# Patient Record
Sex: MALE | Race: WHITE | NOT HISPANIC OR LATINO | URBAN - METROPOLITAN AREA
[De-identification: names, ages, dates, MRNs, and addresses within clinical notes are randomized per-mention and may not be internally consistent; named-entity substitution may affect disease eponyms.]

---

## 2023-12-27 PROCEDURE — 88305 TISSUE EXAM BY PATHOLOGIST: CPT

## 2023-12-27 PROCEDURE — 88305 TISSUE EXAM BY PATHOLOGIST: CPT | Performed by: PATHOLOGY

## 2023-12-28 ENCOUNTER — LAB REQUISITION (OUTPATIENT)
Dept: LAB | Facility: HOSPITAL | Age: 58
End: 2023-12-28
Payer: COMMERCIAL

## 2023-12-28 DIAGNOSIS — M67.472 GANGLION, LEFT ANKLE AND FOOT: ICD-10-CM

## 2023-12-31 LAB
LABORATORY COMMENT REPORT: NORMAL
PATH REPORT.FINAL DX SPEC: NORMAL
PATH REPORT.GROSS SPEC: NORMAL
PATH REPORT.RELEVANT HX SPEC: NORMAL
PATH REPORT.TOTAL CANCER: NORMAL

## 2024-10-02 PROBLEM — R00.2 PALPITATIONS: Status: ACTIVE | Noted: 2024-10-02

## 2024-10-02 NOTE — PROGRESS NOTES
Subjective   Patient ID:   Christopher Ramon is a 59 y.o. male who presents for Rapid Heart Rate and Dizziness.  HPI  New patient here today to establish care with myself.  Last PCP: N/A  Last seen:  No significant PMH.  Not currently on medications.    Here with acute symptoms:  Symptoms x within the past month.  Palpitations.  Off and on.  No specific trigger.  No significant heart history.  BP is very high at 162/100.  Has never been on BP medication.  EKG today shows NSR.  Denies CP, SOB.  Denies dizziness, headaches.    Sinus infection:  Symptoms x 2 weeks.  Sinus pressure.  Right ear pain.  Has worked outside in the rain.  Denies fever.    Tick bites:  Works outside for a living.  Would like checked for lyme disease.  No specific symptoms pertaining to this.    Health maintenance:  Smoking: Never a smoker.  PSA (50+): DUE  Labs: DUE  Colonoscopy (50-75): DUE - will defer for now  Influenza:    Review of Systems  12 point review of systems negative unless stated above in HPI    Vitals:    10/08/24 1055   BP: (!) 162/100   Pulse: 70   SpO2: 99%     Physical Exam  General: Alert and oriented, well nourished, no acute distress.  ENT: Right ear canal is normal.  Lungs: Clear to auscultation, non-labored respiration.  Heart: Normal rate, regular rhythm, no murmur, gallop or edema.  Neurologic: Awake, alert, and oriented X3, CN II-XII intact.  Psychiatric: Cooperative, appropriate mood and affect.    Assessment/Plan   It was nice meeting you!  I have ordered some labs to be done as soon as you can.  We will call you with the results.  EKG was stable in office today.  I have placed a referral to cardiology for further management.  Your BP is high today.  Please start monitoring BP at home.  Call if getting over 140/90 regularly.  I have sent in Metoprolol to begin for BP and palpitations.  You appear to have a sinus infection.  I have sent in the antibiotic Augmentin to your pharmacy.  Please take with  food.  Increase rest and fluids.  Please call if symptoms worsen or do not improve.    F/u  1 month for BP  Diagnoses and all orders for this visit:  Palpitations  -     ECG 12 lead (Clinic Performed)  -     Comprehensive Metabolic Panel; Future  -     Lipid Panel; Future  -     CBC and Auto Differential; Future  -     Referral to Cardiology; Future  -     TSH with reflex to Free T4 if abnormal; Future  -     Vitamin D 25-Hydroxy,Total (for eval of Vitamin D levels); Future  -     Vitamin B12; Future  Screening PSA (prostate specific antigen)  -     Prostate Specific Antigen, Screen; Future  Primary hypertension  -     Referral to Cardiology; Future  -     metoprolol succinate XL (Toprol-XL) 25 mg 24 hr tablet; Take 1 tablet (25 mg) by mouth once daily. Do not crush or chew.  Tick bite, unspecified site, initial encounter  -     LYME (B. BURGDORFERI) AB MODIFIED 2-TITER TESTING, WITH REFLEX TO IGM AND IGG BY HODAN; Future  Acute non-recurrent frontal sinusitis  -     amoxicillin-pot clavulanate (Augmentin) 875-125 mg tablet; Take 1 tablet (875 mg) by mouth 2 times a day for 10 days.  Right ear pain  Sinus pressure

## 2024-10-08 ENCOUNTER — OFFICE VISIT (OUTPATIENT)
Dept: PRIMARY CARE | Facility: CLINIC | Age: 59
End: 2024-10-08
Payer: COMMERCIAL

## 2024-10-08 VITALS
WEIGHT: 158.4 LBS | DIASTOLIC BLOOD PRESSURE: 100 MMHG | HEART RATE: 70 BPM | SYSTOLIC BLOOD PRESSURE: 162 MMHG | OXYGEN SATURATION: 99 %

## 2024-10-08 DIAGNOSIS — J01.10 ACUTE NON-RECURRENT FRONTAL SINUSITIS: ICD-10-CM
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
## 2024-10-08 DIAGNOSIS — J34.89 SINUS PRESSURE: ICD-10-CM

## 2024-10-08 DIAGNOSIS — Z12.5 SCREENING PSA (PROSTATE SPECIFIC ANTIGEN): ICD-10-CM

## 2024-10-08 DIAGNOSIS — R00.2 PALPITATIONS: Primary | ICD-10-CM

## 2024-10-08 DIAGNOSIS — I10 PRIMARY HYPERTENSION: ICD-10-CM

## 2024-10-08 DIAGNOSIS — H92.01 RIGHT EAR PAIN: ICD-10-CM

## 2024-10-08 PROCEDURE — 3077F SYST BP >= 140 MM HG: CPT | Performed by: PHYSICIAN ASSISTANT

## 2024-10-08 PROCEDURE — 99205 OFFICE O/P NEW HI 60 MIN: CPT | Performed by: PHYSICIAN ASSISTANT

## 2024-10-08 PROCEDURE — 93000 ELECTROCARDIOGRAM COMPLETE: CPT | Performed by: PHYSICIAN ASSISTANT

## 2024-10-08 PROCEDURE — 3080F DIAST BP >= 90 MM HG: CPT | Performed by: PHYSICIAN ASSISTANT

## 2024-10-08 PROCEDURE — 1036F TOBACCO NON-USER: CPT | Performed by: PHYSICIAN ASSISTANT

## 2024-10-08 RX ORDER — METOPROLOL SUCCINATE 25 MG/1
25 TABLET, EXTENDED RELEASE ORAL DAILY
Qty: 30 TABLET | Refills: 1 | Status: SHIPPED | OUTPATIENT
Start: 2024-10-08 | End: 2024-12-07

## 2024-10-08 RX ORDER — AMOXICILLIN AND CLAVULANATE POTASSIUM 875; 125 MG/1; MG/1
875 TABLET, FILM COATED ORAL 2 TIMES DAILY
Qty: 20 TABLET | Refills: 0 | Status: SHIPPED | OUTPATIENT
Start: 2024-10-08 | End: 2024-10-18

## 2024-10-08 ASSESSMENT — LIFESTYLE VARIABLES
HOW OFTEN DO YOU HAVE SIX OR MORE DRINKS ON ONE OCCASION: NEVER
HOW OFTEN DO YOU HAVE A DRINK CONTAINING ALCOHOL: 2-4 TIMES A MONTH
HOW MANY STANDARD DRINKS CONTAINING ALCOHOL DO YOU HAVE ON A TYPICAL DAY: 1 OR 2
AUDIT-C TOTAL SCORE: 2
SKIP TO QUESTIONS 9-10: 1

## 2024-10-08 ASSESSMENT — PATIENT HEALTH QUESTIONNAIRE - PHQ9
1. LITTLE INTEREST OR PLEASURE IN DOING THINGS: NOT AT ALL
2. FEELING DOWN, DEPRESSED OR HOPELESS: NOT AT ALL
SUM OF ALL RESPONSES TO PHQ9 QUESTIONS 1 AND 2: 0

## 2024-10-08 ASSESSMENT — ENCOUNTER SYMPTOMS
DEPRESSION: 0
OCCASIONAL FEELINGS OF UNSTEADINESS: 0
LOSS OF SENSATION IN FEET: 0

## 2024-10-08 ASSESSMENT — PAIN SCALES - GENERAL: PAINLEVEL: 0-NO PAIN

## 2024-10-15 ENCOUNTER — LAB (OUTPATIENT)
Dept: LAB | Facility: LAB | Age: 59
End: 2024-10-15

## 2024-10-15 ENCOUNTER — TELEPHONE (OUTPATIENT)
Dept: PRIMARY CARE | Facility: CLINIC | Age: 59
End: 2024-10-15
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
## 2024-10-15 DIAGNOSIS — R73.9 HYPERGLYCEMIA: Primary | ICD-10-CM

## 2024-10-15 DIAGNOSIS — Z12.5 SCREENING PSA (PROSTATE SPECIFIC ANTIGEN): ICD-10-CM

## 2024-10-15 DIAGNOSIS — I10 PRIMARY HYPERTENSION: ICD-10-CM

## 2024-10-15 DIAGNOSIS — R00.2 PALPITATIONS: ICD-10-CM

## 2024-10-15 DIAGNOSIS — R97.20 ELEVATED PSA: ICD-10-CM

## 2024-10-15 DIAGNOSIS — R73.9 HYPERGLYCEMIA: ICD-10-CM

## 2024-10-15 LAB
25(OH)D3 SERPL-MCNC: 50 NG/ML (ref 30–100)
ALBUMIN SERPL BCP-MCNC: 4.4 G/DL (ref 3.4–5)
ALP SERPL-CCNC: 30 U/L (ref 33–120)
ALT SERPL W P-5'-P-CCNC: 24 U/L (ref 10–52)
ANION GAP SERPL CALCULATED.3IONS-SCNC: 10 MMOL/L (ref 10–20)
AST SERPL W P-5'-P-CCNC: 22 U/L (ref 9–39)
BASOPHILS # BLD AUTO: 0.05 X10*3/UL (ref 0–0.1)
BASOPHILS NFR BLD AUTO: 0.8 %
BILIRUB SERPL-MCNC: 0.9 MG/DL (ref 0–1.2)
BUN SERPL-MCNC: 14 MG/DL (ref 6–23)
CALCIUM SERPL-MCNC: 9.7 MG/DL (ref 8.6–10.3)
CHLORIDE SERPL-SCNC: 100 MMOL/L (ref 98–107)
CHOLEST SERPL-MCNC: 231 MG/DL (ref 0–199)
CHOLEST/HDLC SERPL: 3.5 {RATIO}
CO2 SERPL-SCNC: 29 MMOL/L (ref 21–32)
CREAT SERPL-MCNC: 0.77 MG/DL (ref 0.5–1.3)
EGFRCR SERPLBLD CKD-EPI 2021: >90 ML/MIN/1.73M*2
EOSINOPHIL # BLD AUTO: 0.23 X10*3/UL (ref 0–0.7)
EOSINOPHIL NFR BLD AUTO: 3.5 %
ERYTHROCYTE [DISTWIDTH] IN BLOOD BY AUTOMATED COUNT: 11.8 % (ref 11.5–14.5)
EST. AVERAGE GLUCOSE BLD GHB EST-MCNC: 88 MG/DL
GLUCOSE SERPL-MCNC: 110 MG/DL (ref 74–99)
HBA1C MFR BLD: 4.7 %
HCT VFR BLD AUTO: 42.3 % (ref 41–52)
HDLC SERPL-MCNC: 65.5 MG/DL
HGB BLD-MCNC: 14.5 G/DL (ref 13.5–17.5)
IMM GRANULOCYTES # BLD AUTO: 0.01 X10*3/UL (ref 0–0.7)
IMM GRANULOCYTES NFR BLD AUTO: 0.2 % (ref 0–0.9)
LDLC SERPL CALC-MCNC: 147 MG/DL
LYMPHOCYTES # BLD AUTO: 1.45 X10*3/UL (ref 1.2–4.8)
LYMPHOCYTES NFR BLD AUTO: 22.3 %
MCH RBC QN AUTO: 32.8 PG (ref 26–34)
MCHC RBC AUTO-ENTMCNC: 34.3 G/DL (ref 32–36)
MCV RBC AUTO: 96 FL (ref 80–100)
MONOCYTES # BLD AUTO: 0.85 X10*3/UL (ref 0.1–1)
MONOCYTES NFR BLD AUTO: 13.1 %
NEUTROPHILS # BLD AUTO: 3.91 X10*3/UL (ref 1.2–7.7)
NEUTROPHILS NFR BLD AUTO: 60.1 %
NON HDL CHOLESTEROL: 166 MG/DL (ref 0–149)
NRBC BLD-RTO: 0 /100 WBCS (ref 0–0)
PLATELET # BLD AUTO: 259 X10*3/UL (ref 150–450)
POTASSIUM SERPL-SCNC: 3.8 MMOL/L (ref 3.5–5.3)
PROT SERPL-MCNC: 7 G/DL (ref 6.4–8.2)
PSA SERPL-MCNC: 5.34 NG/ML
RBC # BLD AUTO: 4.42 X10*6/UL (ref 4.5–5.9)
SODIUM SERPL-SCNC: 135 MMOL/L (ref 136–145)
TRIGL SERPL-MCNC: 93 MG/DL (ref 0–149)
TSH SERPL-ACNC: 0.91 MIU/L (ref 0.44–3.98)
VIT B12 SERPL-MCNC: 479 PG/ML (ref 211–911)
VLDL: 19 MG/DL (ref 0–40)
WBC # BLD AUTO: 6.5 X10*3/UL (ref 4.4–11.3)

## 2024-10-15 PROCEDURE — 36415 COLL VENOUS BLD VENIPUNCTURE: CPT

## 2024-10-15 PROCEDURE — 84443 ASSAY THYROID STIM HORMONE: CPT

## 2024-10-15 PROCEDURE — G0103 PSA SCREENING: HCPCS

## 2024-10-15 PROCEDURE — 80053 COMPREHEN METABOLIC PANEL: CPT

## 2024-10-15 PROCEDURE — 82607 VITAMIN B-12: CPT

## 2024-10-15 PROCEDURE — 86618 LYME DISEASE ANTIBODY: CPT

## 2024-10-15 PROCEDURE — 80061 LIPID PANEL: CPT

## 2024-10-15 PROCEDURE — 85025 COMPLETE CBC W/AUTO DIFF WBC: CPT

## 2024-10-15 PROCEDURE — 83036 HEMOGLOBIN GLYCOSYLATED A1C: CPT

## 2024-10-15 PROCEDURE — 82306 VITAMIN D 25 HYDROXY: CPT

## 2024-10-15 NOTE — RESULT ENCOUNTER NOTE
Hyperglycemia - I have ordered an A1C to his specimen. Cholesterol is borderline - work on diet and exercise. Waiting on more

## 2024-10-15 NOTE — TELEPHONE ENCOUNTER
Patient would like an RX sent to [facility] on [facility] for a BP machine.  He had no luck finding one on his own.

## 2024-10-16 RX ORDER — ACETAMINOPHEN 500 MG
TABLET ORAL
Qty: 1 KIT | Refills: 0 | Status: SHIPPED | OUTPATIENT
Start: 2024-10-16

## 2024-10-17 LAB — B BURGDOR.VLSE1+PEPC10 AB SER IA-ACNC: 0.53 IV

## 2024-11-04 PROBLEM — R97.20 ELEVATED PSA: Status: ACTIVE | Noted: 2024-11-04

## 2024-11-04 NOTE — PROGRESS NOTES
Subjective   Patient ID:   Christopher Ramon is a 59 y.o. male who presents for Follow-up, Cough, and Headache.  Hypertension  The current episode started 1 to 4 weeks ago. Associated symptoms include headaches. Pertinent negatives include no anxiety, blurred vision, malaise/fatigue or neck pain.     Headache:  Symptoms x months.  Comes and goes.  Having increased fatigue.  Fatigue labs were largely normal in Oct 2024.    Here with acute symptoms:  Symptoms x within the past month.  Palpitations.  Off and on.  No specific trigger.  No significant heart history.  EKG was normal in Oct 2024.  Seeing cardiology now.    HTN:  We started Metoprolol last visit for BP and palpitations.  BP is 108/80.  Denies CP, SOB.  Denies dizziness, headaches.    Sinus infection:  I gave Augmentin last visit.  Sinus pressure.  Right ear pain.  Has worked outside in the rain.  Denies fever.    Tick bites:  Had negative lyme disease titer in Oct 2024.  Works outside for a living.  No specific symptoms pertaining to this.    Elevated PSA:  Seen in Oct 2024.  I referred to urology.    HLD:  Taking Crestor.  Last checked Oct 2024.    Health maintenance:  Smoking: Never a smoker.  PSA (50+): Oct 2024  Labs: Oct 2024  Colonoscopy (50-75): DUE - will defer for now  Influenza:    Review of Systems   Constitutional:  Negative for malaise/fatigue.   Eyes:  Negative for blurred vision.   Musculoskeletal:  Negative for neck pain.   Neurological:  Positive for headaches.     12 point review of systems negative unless stated above in HPI    Vitals:    11/11/24 0734   BP: 108/80   Pulse: 75   SpO2: 96%     Physical Exam  General: Alert and oriented, well nourished, no acute distress.  Lungs: Clear to auscultation, non-labored respiration.  Heart: Normal rate, regular rhythm, no murmur, gallop or edema.  Neurologic: Awake, alert, and oriented X3, CN II-XII intact.  Psychiatric: Cooperative, appropriate mood and affect.    Assessment/Plan   It was good  seeing you!  I have ordered some labs to be done as soon as you can.  We will call you with the results.  This will look at your testosterone level for fatigue.  Headaches sound tension vs. Sinus related.  I have sent in Prednisone and Meloxicam to try for now.  Consider sinus medicine vs. Head CT.  Continue specialty care.  Consider a sleep study.  If symptoms persist or worsen despite current plan of care, please contact your healthcare provider for further evaluation.  Patient instructed to contact the office if there are any questions regarding their care or treatment.   [facility] [phone]    Fu 1 month  Diagnoses and all orders for this visit:  Palpitations  Primary hypertension  Elevated PSA  Pure hypercholesterolemia  Chronic fatigue  -     Testosterone; Future  Chronic nonintractable headache, unspecified headache type  -     meloxicam (Mobic) 15 mg tablet; Take 1 tablet (15 mg) by mouth once daily.  -     predniSONE (Deltasone) 20 mg tablet; TAKE 2 TABLETS BY MOUTH DAYS 1-4. THEN TAKE 1 TABLET BY MOUTH DAYS 5-7.

## 2024-11-06 ENCOUNTER — OFFICE VISIT (OUTPATIENT)
Dept: CARDIOLOGY | Facility: CLINIC | Age: 59
End: 2024-11-06
Payer: COMMERCIAL

## 2024-11-06 ENCOUNTER — HOSPITAL ENCOUNTER (OUTPATIENT)
Dept: CARDIOLOGY | Facility: CLINIC | Age: 59
Discharge: HOME | End: 2024-11-06
Payer: COMMERCIAL

## 2024-11-06 VITALS
OXYGEN SATURATION: 97 % | HEART RATE: 76 BPM | WEIGHT: 155 LBS | SYSTOLIC BLOOD PRESSURE: 138 MMHG | DIASTOLIC BLOOD PRESSURE: 82 MMHG

## 2024-11-06 DIAGNOSIS — R00.2 PALPITATION: Primary | ICD-10-CM

## 2024-11-06 DIAGNOSIS — I10 PRIMARY HYPERTENSION: ICD-10-CM

## 2024-11-06 DIAGNOSIS — E78.5 HYPERLIPIDEMIA, UNSPECIFIED HYPERLIPIDEMIA TYPE: ICD-10-CM

## 2024-11-06 DIAGNOSIS — R00.2 PALPITATIONS: ICD-10-CM

## 2024-11-06 DIAGNOSIS — R07.89 CHEST DISCOMFORT: ICD-10-CM

## 2024-11-06 DIAGNOSIS — R00.2 PALPITATION: ICD-10-CM

## 2024-11-06 PROCEDURE — 3075F SYST BP GE 130 - 139MM HG: CPT | Performed by: INTERNAL MEDICINE

## 2024-11-06 PROCEDURE — 93246 EXT ECG>7D<15D RECORDING: CPT

## 2024-11-06 PROCEDURE — 99214 OFFICE O/P EST MOD 30 MIN: CPT | Performed by: INTERNAL MEDICINE

## 2024-11-06 PROCEDURE — 1036F TOBACCO NON-USER: CPT | Performed by: INTERNAL MEDICINE

## 2024-11-06 PROCEDURE — 3079F DIAST BP 80-89 MM HG: CPT | Performed by: INTERNAL MEDICINE

## 2024-11-06 PROCEDURE — 99204 OFFICE O/P NEW MOD 45 MIN: CPT | Performed by: INTERNAL MEDICINE

## 2024-11-06 RX ORDER — BISMUTH SUBSALICYLATE 262 MG
1 TABLET,CHEWABLE ORAL DAILY
COMMUNITY

## 2024-11-06 RX ORDER — ROSUVASTATIN CALCIUM 20 MG/1
20 TABLET, COATED ORAL DAILY
Qty: 100 TABLET | Refills: 3 | Status: SHIPPED | OUTPATIENT
Start: 2024-11-06 | End: 2025-12-11

## 2024-11-06 ASSESSMENT — ENCOUNTER SYMPTOMS
NEUROLOGICAL NEGATIVE: 1
GASTROINTESTINAL NEGATIVE: 1
OCCASIONAL FEELINGS OF UNSTEADINESS: 1
SHORTNESS OF BREATH: 0
MUSCULOSKELETAL NEGATIVE: 1
CONSTITUTIONAL NEGATIVE: 1
DEPRESSION: 0
LOSS OF SENSATION IN FEET: 0
RESPIRATORY NEGATIVE: 1
CHILLS: 0
COUGH: 0
EYES NEGATIVE: 1
FEVER: 0
ENDOCRINE NEGATIVE: 1

## 2024-11-06 ASSESSMENT — PATIENT HEALTH QUESTIONNAIRE - PHQ9
2. FEELING DOWN, DEPRESSED OR HOPELESS: NOT AT ALL
1. LITTLE INTEREST OR PLEASURE IN DOING THINGS: NOT AT ALL
SUM OF ALL RESPONSES TO PHQ9 QUESTIONS 1 AND 2: 0
2. FEELING DOWN, DEPRESSED OR HOPELESS: NOT AT ALL
1. LITTLE INTEREST OR PLEASURE IN DOING THINGS: NOT AT ALL
SUM OF ALL RESPONSES TO PHQ9 QUESTIONS 1 AND 2: 0

## 2024-11-06 ASSESSMENT — PAIN SCALES - GENERAL: PAINLEVEL_OUTOF10: 0-NO PAIN

## 2024-11-06 NOTE — ASSESSMENT & PLAN NOTE
This is a 59-year-old white male does a history of hypertension hypercholesterolemia and family's coronary artery disease who recently started have some weird feeling in his chest as well as dizziness towards the end of his walk.  He has no heart murmur of note there is no murmur with Valsalva.  Question if he is having arrhythmias or angina.  Would like to get a Zio patch and also a stress test.  Will see him back for report.

## 2024-11-06 NOTE — PROGRESS NOTES
Subjective      Chief Complaint   Patient presents with    Elevated BP concerns          This a 59-year-old white male who comes in for cardiac evaluation.  He is very active.  He notices with exertion he walks towards the end of the walk he started to get some dizziness.  He might get some palpitations.  He has also had some discomforts.  He has a weird feeling in his chest.  Does not rate anywhere does not last long his whole episode may last 10 seconds.  He does walk daily.  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  He works as a  and does not see no problems with this.  He does have a history of hypertension family's coronary disease and hypercholesterolemia.  He is never smoker he is not a diabetic.  An EKG has been done showing a normal sinus rhythm.  He is never had rheumatic fever nor told he had a heart murmur.           Review of Systems   Constitutional: Negative. Negative for chills and fever.   HENT: Negative.     Eyes: Negative.    Respiratory: Negative.  Negative for cough and shortness of breath.    Endocrine: Negative.    Skin: Negative.    Musculoskeletal: Negative.    Gastrointestinal: Negative.    Genitourinary: Negative.    Neurological: Negative.    All other systems reviewed and are negative.       Past Surgical History:   Procedure Laterality Date    HERNIA REPAIR  04/16/2013    Inguinal Hernia Repair    TESTICLE SURGERY          Active Ambulatory Problems     Diagnosis Date Noted    Palpitations 10/02/2024    Primary hypertension 10/08/2024    Tick bite 10/08/2024    Acute non-recurrent frontal sinusitis 10/08/2024    Elevated PSA 11/04/2024     Resolved Ambulatory Problems     Diagnosis Date Noted    No Resolved Ambulatory Problems     Past Medical History:   Diagnosis Date    Spermatocele of epididymis, unspecified         Visit Vitals  /82   Pulse 76   Wt 70.3 kg (155 lb)   SpO2 97%   Smoking Status Never        Objective  "    Constitutional:       Appearance: Healthy appearance.   Eyes:      Pupils: Pupils are equal, round, and reactive to light.   Neck:      Vascular: No JVR. JVD normal.   Pulmonary:      Effort: Pulmonary effort is normal.      Breath sounds: Normal breath sounds.   Cardiovascular:      PMI at left midclavicular line. Normal rate. Regular rhythm. Normal S1. Normal S2.       Murmurs: There is no murmur.      No gallop.  No click. No rub.   Pulses:     Intact distal pulses.   Edema:     Peripheral edema absent.   Abdominal:      Palpations: Abdomen is soft.      Tenderness: There is no abdominal tenderness.   Musculoskeletal: Normal range of motion.      Extremities: No clubbing present.Skin:     General: Skin is warm and dry.   Neurological:      General: No focal deficit present.            Lab Review:         Lab Results   Component Value Date    CHOL 231 (H) 10/15/2024     Lab Results   Component Value Date    HDL 65.5 10/15/2024     Lab Results   Component Value Date    LDLCALC 147 (H) 10/15/2024     Lab Results   Component Value Date    TRIG 93 10/15/2024     No components found for: \"CHOLHDL\"     Assessment/Plan     Palpitations  This is a 59-year-old white male does a history of hypertension hypercholesterolemia and family's coronary artery disease who recently started have some weird feeling in his chest as well as dizziness towards the end of his walk.  He has no heart murmur of note there is no murmur with Valsalva.  Question if he is having arrhythmias or angina.  Would like to get a Zio patch and also a stress test.  Will see him back for report.    Primary hypertension  His blood pressure has been elevated and is improved today.     "

## 2024-11-10 ASSESSMENT — ENCOUNTER SYMPTOMS
BLURRED VISION: 0
HEADACHES: 1
HYPERTENSION: 1
NECK PAIN: 0

## 2024-11-11 ENCOUNTER — OFFICE VISIT (OUTPATIENT)
Dept: PRIMARY CARE | Facility: CLINIC | Age: 59
End: 2024-11-11
Payer: COMMERCIAL

## 2024-11-11 ENCOUNTER — LAB (OUTPATIENT)
Dept: LAB | Facility: LAB | Age: 59
End: 2024-11-11
Payer: COMMERCIAL

## 2024-11-11 VITALS
DIASTOLIC BLOOD PRESSURE: 80 MMHG | SYSTOLIC BLOOD PRESSURE: 108 MMHG | OXYGEN SATURATION: 96 % | HEART RATE: 75 BPM | WEIGHT: 155.8 LBS

## 2024-11-11 DIAGNOSIS — G89.29 CHRONIC NONINTRACTABLE HEADACHE, UNSPECIFIED HEADACHE TYPE: ICD-10-CM

## 2024-11-11 DIAGNOSIS — E78.00 PURE HYPERCHOLESTEROLEMIA: ICD-10-CM

## 2024-11-11 DIAGNOSIS — R51.9 CHRONIC NONINTRACTABLE HEADACHE, UNSPECIFIED HEADACHE TYPE: ICD-10-CM

## 2024-11-11 DIAGNOSIS — R00.2 PALPITATIONS: Primary | ICD-10-CM

## 2024-11-11 DIAGNOSIS — R53.82 CHRONIC FATIGUE: ICD-10-CM

## 2024-11-11 DIAGNOSIS — R97.20 ELEVATED PSA: ICD-10-CM

## 2024-11-11 DIAGNOSIS — I10 PRIMARY HYPERTENSION: ICD-10-CM

## 2024-11-11 LAB — TESTOST SERPL-MCNC: 530 NG/DL (ref 240–1000)

## 2024-11-11 PROCEDURE — 36415 COLL VENOUS BLD VENIPUNCTURE: CPT

## 2024-11-11 PROCEDURE — 3074F SYST BP LT 130 MM HG: CPT | Performed by: PHYSICIAN ASSISTANT

## 2024-11-11 PROCEDURE — 3079F DIAST BP 80-89 MM HG: CPT | Performed by: PHYSICIAN ASSISTANT

## 2024-11-11 PROCEDURE — 1036F TOBACCO NON-USER: CPT | Performed by: PHYSICIAN ASSISTANT

## 2024-11-11 PROCEDURE — 99214 OFFICE O/P EST MOD 30 MIN: CPT | Performed by: PHYSICIAN ASSISTANT

## 2024-11-11 PROCEDURE — 84403 ASSAY OF TOTAL TESTOSTERONE: CPT

## 2024-11-11 RX ORDER — MELOXICAM 15 MG/1
15 TABLET ORAL DAILY
Qty: 30 TABLET | Refills: 1 | Status: SHIPPED | OUTPATIENT
Start: 2024-11-11 | End: 2025-01-10

## 2024-11-11 RX ORDER — PREDNISONE 20 MG/1
TABLET ORAL
Qty: 11 TABLET | Refills: 0 | Status: SHIPPED | OUTPATIENT
Start: 2024-11-11

## 2024-11-11 ASSESSMENT — LIFESTYLE VARIABLES
SKIP TO QUESTIONS 9-10: 1
HOW MANY STANDARD DRINKS CONTAINING ALCOHOL DO YOU HAVE ON A TYPICAL DAY: 1 OR 2
HOW OFTEN DO YOU HAVE SIX OR MORE DRINKS ON ONE OCCASION: NEVER
HOW OFTEN DO YOU HAVE A DRINK CONTAINING ALCOHOL: MONTHLY OR LESS
AUDIT-C TOTAL SCORE: 1

## 2024-11-11 ASSESSMENT — ENCOUNTER SYMPTOMS
DEPRESSION: 0
OCCASIONAL FEELINGS OF UNSTEADINESS: 0
NECK PAIN: 0
HEADACHES: 1
BLURRED VISION: 0
LOSS OF SENSATION IN FEET: 0
HYPERTENSION: 1

## 2024-11-11 ASSESSMENT — PAIN SCALES - GENERAL: PAINLEVEL_OUTOF10: 3

## 2024-11-22 ENCOUNTER — HOSPITAL ENCOUNTER (OUTPATIENT)
Dept: CARDIOLOGY | Facility: HOSPITAL | Age: 59
Discharge: HOME | End: 2024-11-22
Payer: COMMERCIAL

## 2024-11-22 DIAGNOSIS — R07.89 CHEST DISCOMFORT: ICD-10-CM

## 2024-11-22 PROCEDURE — 93017 CV STRESS TEST TRACING ONLY: CPT

## 2024-11-25 NOTE — PROGRESS NOTES
Subjective   Patient ID: Christopher Ramon is a 59 y.o. male    HPI  59 y.o. male who presents for elevated PSA. He states he has not any any sympotoms as of today. He has 1 right testicle. He reports having a mass on left testicle. He states the mass was removed but it grew back, and a left orchiectomy was performed. He reports minimum problems with erection. He reports nocturia 1 to 2 times nightly.    The most recent Testosterone, conducted on 11/11/2024, revealed:  530 ng/dL    The most recent PSA, conducted on 10/15/2024, revealed:  5.34 ng/mL      Review of Systems    All systems were reviewed. Anything negative was noted in the HPI.    Objective   Physical Exam    General: Well developed, well nourished, alert and cooperative, appears in no acute distress   Eyes: Non-injected conjunctiva, sclera clear, no proptosis   Cardiac: Extremities are warm and well perfused. No edema, cyanosis or pallor   Lungs: Breathing is easy, non-labored. Speaking in clear and complete sentences. Normal diaphragmatic movement   MSK: Ambulatory with steady gait, unassisted   Neuro: Alert and oriented to person, place, and time   Psych: Demonstrates good judgment and reason, without hallucinations, abnormal affect or abnormal behaviors   Skin: No obvious lesions, no rashes       No CVA tenderness bilaterally   No suprapubic pain or discomfort       Past Medical History:   Diagnosis Date    Elevated PSA     Spermatocele of epididymis, unspecified     Spermatocele         Past Surgical History:   Procedure Laterality Date    HERNIA REPAIR  04/16/2013    Inguinal Hernia Repair    TESTICLE SURGERY           Assessment/Plan   Elevated PSA    59 y.o. male who presents for the above condition, I explained to him that PSA should not be checked after the age of 75 as per NCCN and AUA guidelines.  Explained to him that he has the option to stop checking PSA, proceed with an MRI of the prostate, proceed with a biopsy.  Or proceed with  reevaluation of his PSA trend in 5 months.  We discussed the risk, benefit, potential complication, adverse event, survival benefit, quality of life of each option of management. We had a very long and extensive discussion with the patient regarding elevated PSA. I explained to him the pathophysiology, differential diagnosis, risk factor, associated conditions, and management. I explained to him that elevated PSA could be either due to BPH, prostate infection or inflammation or prostate adenocarcinoma. We discussed the need to do a work-up to rule out any underlying prostate adenocarcinoma in the form of MR fusion biopsy if his MRI is positive or regular TRUS biopsy of the MRI is negative or we cannot do an MRI of the prostate. We discussed at length the risk, benefit, potential complication, adverse events of prostate biopsy including pain, discomfort, urinary retention, hematuria, pneumaturia, hematospermia. Explained to him that there is a 2% to 5% risk of complicated urinary infection and urosepsis. Explained to him indicates it happens, he will need to be admitted for IV antibiotic for 2 to 3 days at the hospital.       - Avoid caffeine entirely     - Implement a timed voiding schedule (every 2 to 2.5 hours).    - Discussed the importance of controlling fluid intake and avoiding caffeine.      -Pt denies any metal in body.    Plan:  - Follow-up with MRI of prostate results    E&M visit today is associated with current or anticipated ongoing medical care services related to a patient's single, serious condition or a complex condition.     11/26/2024    Scribe Attestation  By signing my name below, IMarcos Scribe attest that this documentation has been prepared under the direction and in the presence of Dr. Janak Black.

## 2024-11-26 ENCOUNTER — OFFICE VISIT (OUTPATIENT)
Dept: UROLOGY | Facility: HOSPITAL | Age: 59
End: 2024-11-26
Payer: COMMERCIAL

## 2024-11-26 DIAGNOSIS — R97.20 ELEVATED PSA: Primary | ICD-10-CM

## 2024-11-26 PROCEDURE — 99417 PROLNG OP E/M EACH 15 MIN: CPT | Performed by: STUDENT IN AN ORGANIZED HEALTH CARE EDUCATION/TRAINING PROGRAM

## 2024-11-26 PROCEDURE — 99204 OFFICE O/P NEW MOD 45 MIN: CPT | Performed by: STUDENT IN AN ORGANIZED HEALTH CARE EDUCATION/TRAINING PROGRAM

## 2024-11-26 PROCEDURE — 99214 OFFICE O/P EST MOD 30 MIN: CPT | Performed by: STUDENT IN AN ORGANIZED HEALTH CARE EDUCATION/TRAINING PROGRAM

## 2024-12-02 PROBLEM — G89.29 CHRONIC NONINTRACTABLE HEADACHE: Status: ACTIVE | Noted: 2024-12-02

## 2024-12-02 PROBLEM — J34.89 SINUS PRESSURE: Status: ACTIVE | Noted: 2024-12-02

## 2024-12-02 PROBLEM — R51.9 CHRONIC NONINTRACTABLE HEADACHE: Status: ACTIVE | Noted: 2024-12-02

## 2024-12-02 PROBLEM — R53.82 CHRONIC FATIGUE: Status: ACTIVE | Noted: 2024-12-02

## 2024-12-02 ASSESSMENT — ENCOUNTER SYMPTOMS
NECK PAIN: 0
HYPERTENSION: 1
HEADACHES: 1

## 2024-12-02 NOTE — PROGRESS NOTES
Subjective   Patient ID:   Christopher Ramon is a 59 y.o. male who presents for Hypertension and Follow-up.    Headache:  I gave Prednisone and Meloxicam last visit.  Symptoms x months.  Comes and goes.  Having increased fatigue.  Fatigue labs were largely normal in Oct 2024.  Consider head CT vs. Sinus medicine.    Here with acute symptoms:  Symptoms x within the past month.  Palpitations.  Off and on.  No specific trigger.  No significant heart history.  EKG was normal in Oct 2024.  Seeing cardiology now.  Had a normal stress test in Nov 2024.    HTN:  Taking Metoprolol.  BP is 118/72.  Denies CP, SOB.  Denies dizziness, headaches.    Sinus infection:  I gave Augmentin last visit.  Sinus pressure.  Right ear pain.  Has worked outside in the rain.  Denies fever.    Tick bites:  Had negative lyme disease titer in Oct 2024.  Works outside for a living.  No specific symptoms pertaining to this.    Elevated PSA:  Seen in Oct 2024.  Seeing urology now.  Has an MRI upcoming.    HLD:  Taking Crestor.  Last checked Oct 2024.    Health maintenance:  Smoking: Never a smoker.  PSA (50+): Oct 2024  Labs: Oct 2024  Colonoscopy (50-75): DUE  Influenza:    Review of Systems   Musculoskeletal:  Negative for neck pain.   Neurological:  Positive for headaches.     12 point review of systems negative unless stated above in HPI    Vitals:    12/11/24 0730   BP: 118/72   Pulse: 96   SpO2: 95%     Physical Exam  General: Alert and oriented, well nourished, no acute distress.  Lungs: Clear to auscultation, non-labored respiration.  Heart: Normal rate, regular rhythm, no murmur, gallop or edema.  Neurologic: Awake, alert, and oriented X3, CN II-XII intact.  Psychiatric: Cooperative, appropriate mood and affect.    Assessment/Plan   It was good seeing you!  I have placed a referral to dermatology for further management.  I have placed a referral for you to have a colonoscopy done as soon as you are able to.  Continue specialty  care.  Continue the same medications.  Chronic conditions are stable.  Call with questions or concerns.    Follow up  6 months  Diagnoses and all orders for this visit:  Primary hypertension  Palpitations  Elevated PSA  Pure hypercholesterolemia  Chronic fatigue  Chronic nonintractable headache, unspecified headache type  Sinus pressure  Skin lesions  -     Referral to Dermatology  Colon cancer screening  -     Colonoscopy Screening; Average Risk Patient; Future

## 2024-12-05 DIAGNOSIS — I10 PRIMARY HYPERTENSION: ICD-10-CM

## 2024-12-05 RX ORDER — METOPROLOL SUCCINATE 25 MG/1
25 TABLET, EXTENDED RELEASE ORAL DAILY
Qty: 30 TABLET | Refills: 1 | Status: SHIPPED | OUTPATIENT
Start: 2024-12-05 | End: 2025-02-03

## 2024-12-11 ENCOUNTER — OFFICE VISIT (OUTPATIENT)
Dept: PRIMARY CARE | Facility: CLINIC | Age: 59
End: 2024-12-11
Payer: COMMERCIAL

## 2024-12-11 ENCOUNTER — HOSPITAL ENCOUNTER (OUTPATIENT)
Dept: RADIOLOGY | Facility: HOSPITAL | Age: 59
Discharge: HOME | End: 2024-12-11
Payer: COMMERCIAL

## 2024-12-11 VITALS
WEIGHT: 158 LBS | HEART RATE: 96 BPM | OXYGEN SATURATION: 95 % | SYSTOLIC BLOOD PRESSURE: 118 MMHG | DIASTOLIC BLOOD PRESSURE: 72 MMHG

## 2024-12-11 DIAGNOSIS — G89.29 CHRONIC NONINTRACTABLE HEADACHE, UNSPECIFIED HEADACHE TYPE: ICD-10-CM

## 2024-12-11 DIAGNOSIS — I10 PRIMARY HYPERTENSION: Primary | ICD-10-CM

## 2024-12-11 DIAGNOSIS — R51.9 CHRONIC NONINTRACTABLE HEADACHE, UNSPECIFIED HEADACHE TYPE: ICD-10-CM

## 2024-12-11 DIAGNOSIS — Z12.11 COLON CANCER SCREENING: ICD-10-CM

## 2024-12-11 DIAGNOSIS — R97.20 ELEVATED PSA: ICD-10-CM

## 2024-12-11 DIAGNOSIS — E78.00 PURE HYPERCHOLESTEROLEMIA: ICD-10-CM

## 2024-12-11 DIAGNOSIS — R00.2 PALPITATIONS: ICD-10-CM

## 2024-12-11 DIAGNOSIS — J34.89 SINUS PRESSURE: ICD-10-CM

## 2024-12-11 DIAGNOSIS — R53.82 CHRONIC FATIGUE: ICD-10-CM

## 2024-12-11 DIAGNOSIS — L98.9 SKIN LESIONS: ICD-10-CM

## 2024-12-11 PROCEDURE — 3078F DIAST BP <80 MM HG: CPT | Performed by: PHYSICIAN ASSISTANT

## 2024-12-11 PROCEDURE — 72197 MRI PELVIS W/O & W/DYE: CPT

## 2024-12-11 PROCEDURE — 2550000001 HC RX 255 CONTRASTS: Performed by: STUDENT IN AN ORGANIZED HEALTH CARE EDUCATION/TRAINING PROGRAM

## 2024-12-11 PROCEDURE — 1036F TOBACCO NON-USER: CPT | Performed by: PHYSICIAN ASSISTANT

## 2024-12-11 PROCEDURE — A9575 INJ GADOTERATE MEGLUMI 0.1ML: HCPCS | Performed by: STUDENT IN AN ORGANIZED HEALTH CARE EDUCATION/TRAINING PROGRAM

## 2024-12-11 PROCEDURE — 99213 OFFICE O/P EST LOW 20 MIN: CPT | Performed by: PHYSICIAN ASSISTANT

## 2024-12-11 PROCEDURE — 3074F SYST BP LT 130 MM HG: CPT | Performed by: PHYSICIAN ASSISTANT

## 2024-12-11 RX ORDER — GADOTERATE MEGLUMINE 376.9 MG/ML
14 INJECTION INTRAVENOUS
Status: COMPLETED | OUTPATIENT
Start: 2024-12-11 | End: 2024-12-11

## 2024-12-11 ASSESSMENT — LIFESTYLE VARIABLES
HOW OFTEN DO YOU HAVE A DRINK CONTAINING ALCOHOL: MONTHLY OR LESS
HOW OFTEN DO YOU HAVE SIX OR MORE DRINKS ON ONE OCCASION: NEVER
SKIP TO QUESTIONS 9-10: 1
AUDIT-C TOTAL SCORE: 1
HOW MANY STANDARD DRINKS CONTAINING ALCOHOL DO YOU HAVE ON A TYPICAL DAY: 1 OR 2

## 2024-12-11 ASSESSMENT — ENCOUNTER SYMPTOMS
DEPRESSION: 0
OCCASIONAL FEELINGS OF UNSTEADINESS: 0
LOSS OF SENSATION IN FEET: 0

## 2024-12-11 ASSESSMENT — PAIN SCALES - GENERAL: PAINLEVEL_OUTOF10: 0-NO PAIN

## 2024-12-11 ASSESSMENT — PATIENT HEALTH QUESTIONNAIRE - PHQ9: 1. LITTLE INTEREST OR PLEASURE IN DOING THINGS: NOT AT ALL

## 2024-12-17 NOTE — PROGRESS NOTES
"Subjective      Chief Complaint   Patient presents with    1MO/RESULTS          He was seen a month ago does have history of hypertension hypercholesterolemia family's coronary artery disease who was getting some dizziness and chest discomfort with exertion.  He underwent a Zio patch and a stress test.  On the stress test he was able to exercise for 8 minutes or 9 METS of the Norbert protocol with a heart rate of 164 with no EKG changes.  The Zio patch showed normal sinus rhythm with rare PACs he did have 5 runs of SVT longest being 7 beats and no symptoms.           ROS     Past Surgical History:   Procedure Laterality Date    HERNIA REPAIR  04/16/2013    Inguinal Hernia Repair    TESTICLE SURGERY          Active Ambulatory Problems     Diagnosis Date Noted    Palpitations 10/02/2024    Primary hypertension 10/08/2024    Tick bite 10/08/2024    Acute non-recurrent frontal sinusitis 10/08/2024    Elevated PSA 11/04/2024    Pure hypercholesterolemia 11/11/2024    Chronic fatigue 12/02/2024    Chronic nonintractable headache 12/02/2024    Sinus pressure 12/02/2024    Skin lesions 12/11/2024     Resolved Ambulatory Problems     Diagnosis Date Noted    No Resolved Ambulatory Problems     Past Medical History:   Diagnosis Date    Spermatocele of epididymis, unspecified         Visit Vitals  Smoking Status Never        Objective     Physical Exam       Lab Review:         Lab Results   Component Value Date    CHOL 231 (H) 10/15/2024     Lab Results   Component Value Date    HDL 65.5 10/15/2024     Lab Results   Component Value Date    LDLCALC 147 (H) 10/15/2024     Lab Results   Component Value Date    TRIG 93 10/15/2024     No components found for: \"CHOLHDL\"     Assessment/Plan     No problem-specific Assessment & Plan notes found for this encounter.     "

## 2024-12-18 ENCOUNTER — OFFICE VISIT (OUTPATIENT)
Dept: CARDIOLOGY | Facility: CLINIC | Age: 59
End: 2024-12-18
Payer: COMMERCIAL

## 2024-12-18 VITALS
HEART RATE: 81 BPM | SYSTOLIC BLOOD PRESSURE: 132 MMHG | WEIGHT: 158 LBS | OXYGEN SATURATION: 98 % | DIASTOLIC BLOOD PRESSURE: 80 MMHG

## 2024-12-18 DIAGNOSIS — E78.5 HYPERLIPIDEMIA, UNSPECIFIED HYPERLIPIDEMIA TYPE: Primary | ICD-10-CM

## 2024-12-18 PROCEDURE — 3079F DIAST BP 80-89 MM HG: CPT | Performed by: INTERNAL MEDICINE

## 2024-12-18 PROCEDURE — 1036F TOBACCO NON-USER: CPT | Performed by: INTERNAL MEDICINE

## 2024-12-18 PROCEDURE — 3075F SYST BP GE 130 - 139MM HG: CPT | Performed by: INTERNAL MEDICINE

## 2024-12-18 ASSESSMENT — PATIENT HEALTH QUESTIONNAIRE - PHQ9
2. FEELING DOWN, DEPRESSED OR HOPELESS: NOT AT ALL
SUM OF ALL RESPONSES TO PHQ9 QUESTIONS 1 AND 2: 0
1. LITTLE INTEREST OR PLEASURE IN DOING THINGS: NOT AT ALL

## 2024-12-18 ASSESSMENT — PAIN SCALES - GENERAL: PAINLEVEL_OUTOF10: 0-NO PAIN

## 2024-12-18 ASSESSMENT — ENCOUNTER SYMPTOMS
OCCASIONAL FEELINGS OF UNSTEADINESS: 0
DEPRESSION: 0
LOSS OF SENSATION IN FEET: 0

## 2024-12-18 NOTE — PROGRESS NOTES
Subjective   Patient ID: Christopher Ramon is a 59 y.o. male    HPI  59 y.o. male who presents for a follow-up visit with elevated PSA. He states he has not any any symptoms as of today. He has 1 right testicle. He reports having a mass on left testicle. He states the mass was removed but it grew back, and a left orchiectomy was performed. He reports minimum problems with erection. He reports nocturia 1 to 2 times nightly. Most recent PSA is 5.34 (10/15/2024).     The most recent MR prostate with lenka boundaries if pirads 3 or above, conducted on 12/11/2024, revealed:  1. PI-RADS 3 lesion within the right posterolateral peripheral zone  at the level of the prostatic base.      Review of Systems    All systems were reviewed. Anything negative was noted in the HPI.    Objective   Physical Exam    General: Well developed, well nourished, alert and cooperative, appears in no acute distress   Eyes: Non-injected conjunctiva, sclera clear, no proptosis   Cardiac: Extremities are warm and well perfused. No edema, cyanosis or pallor   Lungs: Breathing is easy, non-labored. Speaking in clear and complete sentences. Normal diaphragmatic movement   MSK: Ambulatory with steady gait, unassisted   Neuro: Alert and oriented to person, place, and time   Psych: Demonstrates good judgment and reason, without hallucinations, abnormal affect or abnormal behaviors   Skin: No obvious lesions, no rashes       No CVA tenderness bilaterally   No suprapubic pain or discomfort       Past Medical History:   Diagnosis Date    Elevated PSA     Spermatocele of epididymis, unspecified     Spermatocele         Past Surgical History:   Procedure Laterality Date    HERNIA REPAIR  04/16/2013    Inguinal Hernia Repair    TESTICLE SURGERY           Assessment/Plan   Elevated PSA, PI-RADS 3 lesion on MR    59 y.o. male who presents for the above condition, I explained to him that PSA should not be checked after the age of 75 as per NCCN and AUA  guidelines.  Explained to him that he has the option to stop checking PSA, proceed with an MRI of the prostate, proceed with a biopsy or proceed with reevaluation of his PSA trend in 5 months.  We discussed the risk, benefit, potential complication, adverse event, survival benefit, quality of life of each option of management. We had a very long and extensive discussion with the patient regarding elevated PSA. I explained to him the pathophysiology, differential diagnosis, risk factor, associated conditions, and management. I explained to him that elevated PSA could be either due to BPH, prostate infection or inflammation or prostate adenocarcinoma. We discussed the need to do a work-up to rule out any underlying prostate adenocarcinoma in the form of MR fusion biopsy if his MRI is positive or regular TRUS biopsy of the MRI is negative or we cannot do an MRI of the prostate. We discussed at length the risk, benefit, potential complication, adverse events of prostate biopsy including pain, discomfort, urinary retention, hematuria, pneumaturia, hematospermia. Explained to him that there is a 2% to 5% risk of complicated urinary infection and urosepsis. Explained to him indicates it happens, he will need to be admitted for IV antibiotic for 2 to 3 days at the hospital.     - Provided pt with surgical intervention and observation options.    Plan:  - Schedule for prostate biopsy 02/07/2025    E&M visit today is associated with current or anticipated ongoing medical care services related to a patient's single, serious condition or a complex condition.     12/19/2024    Scribe Attestation  By signing my name below, IMarcos Scribe attest that this documentation has been prepared under the direction and in the presence of Dr. Janak Black.

## 2024-12-19 ENCOUNTER — PREP FOR PROCEDURE (OUTPATIENT)
Dept: UROLOGY | Facility: HOSPITAL | Age: 59
End: 2024-12-19

## 2024-12-19 ENCOUNTER — APPOINTMENT (OUTPATIENT)
Dept: UROLOGY | Facility: HOSPITAL | Age: 59
End: 2024-12-19
Payer: COMMERCIAL

## 2024-12-19 DIAGNOSIS — R97.20 PSA ELEVATION: Primary | ICD-10-CM

## 2024-12-19 DIAGNOSIS — Z79.2 PROPHYLACTIC ANTIBIOTIC: Primary | ICD-10-CM

## 2024-12-19 PROCEDURE — G2211 COMPLEX E/M VISIT ADD ON: HCPCS | Performed by: STUDENT IN AN ORGANIZED HEALTH CARE EDUCATION/TRAINING PROGRAM

## 2024-12-19 PROCEDURE — 99214 OFFICE O/P EST MOD 30 MIN: CPT | Performed by: STUDENT IN AN ORGANIZED HEALTH CARE EDUCATION/TRAINING PROGRAM

## 2024-12-19 RX ORDER — CIPROFLOXACIN 500 MG/1
500 TABLET ORAL 2 TIMES DAILY
Qty: 6 TABLET | Refills: 0 | Status: SHIPPED | OUTPATIENT
Start: 2024-12-19 | End: 2024-12-22

## 2024-12-20 ENCOUNTER — PATIENT MESSAGE (OUTPATIENT)
Dept: UROLOGY | Facility: HOSPITAL | Age: 59
End: 2024-12-20
Payer: COMMERCIAL

## 2025-01-06 DIAGNOSIS — I10 PRIMARY HYPERTENSION: ICD-10-CM

## 2025-01-06 RX ORDER — METOPROLOL SUCCINATE 25 MG/1
25 TABLET, EXTENDED RELEASE ORAL DAILY
Qty: 90 TABLET | Refills: 1 | Status: SHIPPED | OUTPATIENT
Start: 2025-01-06 | End: 2025-07-05

## 2025-01-17 ENCOUNTER — CLINICAL SUPPORT (OUTPATIENT)
Dept: PREADMISSION TESTING | Facility: HOSPITAL | Age: 60
End: 2025-01-17
Payer: COMMERCIAL

## 2025-01-17 DIAGNOSIS — R97.20 PSA ELEVATION: ICD-10-CM

## 2025-01-17 RX ORDER — ACETAMINOPHEN 325 MG/1
325 TABLET ORAL EVERY 6 HOURS PRN
COMMUNITY

## 2025-01-17 RX ORDER — ASPIRIN 81 MG/1
81 TABLET ORAL DAILY
COMMUNITY

## 2025-01-17 NOTE — CPM/PAT NURSE NOTE
CPM/PAT Nurse Note      Name: Christopher Ramon (Christopher Ramon)  /Age: 1965/59 y.o.       Past Medical History:   Diagnosis Date    Elevated PSA     10/15/24 5.34    Hyperlipidemia     Hypertension     Palpitations     Dr. Maza note 24, monitor showed SVT/PAC, no treatment    Spermatocele of epididymis, unspecified     Spermatocele       Past Surgical History:   Procedure Laterality Date    FOOT FRACTURE SURGERY Left     24 s/p aspirin 81mg for 30 days    HERNIA REPAIR  2013    Inguinal Hernia Repair    TESTICLE SURGERY         Patient Sexual activity questions deferred to the physician.    Family History   Problem Relation Name Age of Onset    Diabetes Mother      Heart murmur Father Gint     Hyperlipidemia Father Gint     No Known Problems Sister      No Known Problems Brother      No Known Problems Brother      No Known Problems Brother         Allergies   Allergen Reactions    Chicken Meat Extract GI Upset       Prior to Admission medications    Medication Sig Start Date End Date Taking? Authorizing Provider   acetaminophen (Tylenol) 325 mg tablet Take 1 tablet (325 mg) by mouth every 6 hours if needed for mild pain (1 - 3).    Historical Provider, MD   aspirin 81 mg EC tablet Take 1 tablet (81 mg) by mouth once daily. For 30 days, after ORIF of Left foot,    Historical Provider, MD   blood pressure monitor (Blood Pressure Kit) kit USE TO CHECK BLOOD PRESSURE ONCE DAILY 10/16/24   Gaudencio Caballero PA-C   metoprolol succinate XL (Toprol-XL) 25 mg 24 hr tablet Take 1 tablet (25 mg) by mouth once daily. Do not crush or chew. 25  Gaudencio Caballero PA-C   multivitamin tablet Take 1 tablet by mouth once daily.    Historical Provider, MD   rosuvastatin (Crestor) 20 mg tablet Take 1 tablet (20 mg) by mouth once daily. 24  Catrachito Maza MD        PAT ROS     DASI Risk Score    No data to display       Caprini DVT Assessment    No data to display       Modified  Frailty Index    No data to display       CHADS2 Stroke Risk  Current as of about an hour ago        N/A 3 to 100%: High Risk   2 to < 3%: Medium Risk   0 to < 2%: Low Risk     Last Change: N/A          This score determines the patient's risk of having a stroke if the patient has atrial fibrillation.        This score is not applicable to this patient. Components are not calculated.          Revised Cardiac Risk Index    No data to display       Apfel Simplified Score    No data to display       Risk Analysis Index Results This Encounter    No data found in the last 10 encounters.       Prodigy: High Risk  Total Score: 8              Prodigy Gender Score          ARISCAT Score for Postoperative Pulmonary Complications    No data to display       Lal Perioperative Risk for Myocardial Infarction or Cardiac Arrest (MARI)    No data to display         Nurse Plan of Action:   RN screening call complete.  Reviewed allergies, medications and pharmacy, medical, surgical and social history with patient.  Chart updated.  Instructed patient to stop OTC medications prior to surgery.

## 2025-01-20 ENCOUNTER — APPOINTMENT (OUTPATIENT)
Dept: UROLOGY | Facility: CLINIC | Age: 60
End: 2025-01-20
Payer: COMMERCIAL

## 2025-01-24 ENCOUNTER — TELEPHONE (OUTPATIENT)
Dept: PREADMISSION TESTING | Facility: HOSPITAL | Age: 60
End: 2025-01-24

## 2025-01-24 ENCOUNTER — LAB (OUTPATIENT)
Dept: LAB | Facility: LAB | Age: 60
End: 2025-01-24
Payer: COMMERCIAL

## 2025-01-24 ENCOUNTER — PRE-ADMISSION TESTING (OUTPATIENT)
Dept: PREADMISSION TESTING | Facility: HOSPITAL | Age: 60
End: 2025-01-24
Payer: COMMERCIAL

## 2025-01-24 ENCOUNTER — DOCUMENTATION (OUTPATIENT)
Dept: PREADMISSION TESTING | Facility: HOSPITAL | Age: 60
End: 2025-01-24

## 2025-01-24 VITALS
SYSTOLIC BLOOD PRESSURE: 158 MMHG | WEIGHT: 154.32 LBS | HEIGHT: 69 IN | TEMPERATURE: 98.1 F | DIASTOLIC BLOOD PRESSURE: 98 MMHG | RESPIRATION RATE: 18 BRPM | HEART RATE: 62 BPM | OXYGEN SATURATION: 100 % | BODY MASS INDEX: 22.86 KG/M2

## 2025-01-24 DIAGNOSIS — Z01.818 PREOP TESTING: ICD-10-CM

## 2025-01-24 DIAGNOSIS — Z01.818 PREOP TESTING: Primary | ICD-10-CM

## 2025-01-24 LAB
ANION GAP SERPL CALC-SCNC: 11 MMOL/L (ref 10–20)
APPEARANCE UR: CLEAR
BASOPHILS # BLD AUTO: 0.04 X10*3/UL (ref 0–0.1)
BASOPHILS NFR BLD AUTO: 0.7 %
BILIRUB UR STRIP.AUTO-MCNC: NEGATIVE MG/DL
BUN SERPL-MCNC: 16 MG/DL (ref 6–23)
CALCIUM SERPL-MCNC: 9.3 MG/DL (ref 8.6–10.3)
CHLORIDE SERPL-SCNC: 96 MMOL/L (ref 98–107)
CO2 SERPL-SCNC: 26 MMOL/L (ref 21–32)
COLOR UR: COLORLESS
CREAT SERPL-MCNC: 0.73 MG/DL (ref 0.5–1.3)
EGFRCR SERPLBLD CKD-EPI 2021: >90 ML/MIN/1.73M*2
EOSINOPHIL # BLD AUTO: 0.2 X10*3/UL (ref 0–0.7)
EOSINOPHIL NFR BLD AUTO: 3.6 %
ERYTHROCYTE [DISTWIDTH] IN BLOOD BY AUTOMATED COUNT: 12 % (ref 11.5–14.5)
GLUCOSE SERPL-MCNC: 114 MG/DL (ref 74–99)
GLUCOSE UR STRIP.AUTO-MCNC: NORMAL MG/DL
HCT VFR BLD AUTO: 42.3 % (ref 41–52)
HGB BLD-MCNC: 14.5 G/DL (ref 13.5–17.5)
HOLD SPECIMEN: NORMAL
IMM GRANULOCYTES # BLD AUTO: 0.02 X10*3/UL (ref 0–0.7)
IMM GRANULOCYTES NFR BLD AUTO: 0.4 % (ref 0–0.9)
KETONES UR STRIP.AUTO-MCNC: NEGATIVE MG/DL
LEUKOCYTE ESTERASE UR QL STRIP.AUTO: NEGATIVE
LYMPHOCYTES # BLD AUTO: 1.81 X10*3/UL (ref 1.2–4.8)
LYMPHOCYTES NFR BLD AUTO: 32.8 %
MCH RBC QN AUTO: 32.4 PG (ref 26–34)
MCHC RBC AUTO-ENTMCNC: 34.3 G/DL (ref 32–36)
MCV RBC AUTO: 94 FL (ref 80–100)
MONOCYTES # BLD AUTO: 0.87 X10*3/UL (ref 0.1–1)
MONOCYTES NFR BLD AUTO: 15.8 %
NEUTROPHILS # BLD AUTO: 2.58 X10*3/UL (ref 1.2–7.7)
NEUTROPHILS NFR BLD AUTO: 46.7 %
NITRITE UR QL STRIP.AUTO: NEGATIVE
NRBC BLD-RTO: 0 /100 WBCS (ref 0–0)
PH UR STRIP.AUTO: 7 [PH]
PLATELET # BLD AUTO: 277 X10*3/UL (ref 150–450)
POTASSIUM SERPL-SCNC: 4.3 MMOL/L (ref 3.5–5.3)
PROT UR STRIP.AUTO-MCNC: NEGATIVE MG/DL
RBC # BLD AUTO: 4.48 X10*6/UL (ref 4.5–5.9)
RBC # UR STRIP.AUTO: NEGATIVE /UL
SODIUM SERPL-SCNC: 129 MMOL/L (ref 136–145)
SP GR UR STRIP.AUTO: 1.01
UROBILINOGEN UR STRIP.AUTO-MCNC: NORMAL MG/DL
WBC # BLD AUTO: 5.5 X10*3/UL (ref 4.4–11.3)

## 2025-01-24 PROCEDURE — 80048 BASIC METABOLIC PNL TOTAL CA: CPT

## 2025-01-24 PROCEDURE — 85025 COMPLETE CBC W/AUTO DIFF WBC: CPT

## 2025-01-24 PROCEDURE — 81003 URINALYSIS AUTO W/O SCOPE: CPT

## 2025-01-24 PROCEDURE — 99204 OFFICE O/P NEW MOD 45 MIN: CPT | Performed by: PHYSICIAN ASSISTANT

## 2025-01-24 RX ORDER — VIT C/E/ZN/COPPR/LUTEIN/ZEAXAN 250MG-90MG
25 CAPSULE ORAL DAILY
COMMUNITY

## 2025-01-24 ASSESSMENT — ENCOUNTER SYMPTOMS
CARDIOVASCULAR NEGATIVE: 1
NEUROLOGICAL NEGATIVE: 1
CONSTITUTIONAL NEGATIVE: 1
EYES NEGATIVE: 1
RESPIRATORY NEGATIVE: 1
PSYCHIATRIC NEGATIVE: 1
ENDOCRINE NEGATIVE: 1
HEMATOLOGIC/LYMPHATIC NEGATIVE: 1
GASTROINTESTINAL NEGATIVE: 1
ALLERGIC/IMMUNOLOGIC NEGATIVE: 1
MUSCULOSKELETAL NEGATIVE: 1

## 2025-01-24 NOTE — PREPROCEDURE INSTRUCTIONS
Medication List            Accurate as of January 24, 2025  9:48 AM. Always use your most recent med list.                acetaminophen 325 mg tablet  Commonly known as: Tylenol  Notes to patient: Use if needed morning of surgery        aspirin 81 mg EC tablet  Medication Adjustments for Surgery: Take on the morning of surgery     blood pressure monitor kit  Commonly known as: Blood Pressure Kit  USE TO CHECK BLOOD PRESSURE ONCE DAILY     cholecalciferol 25 MCG (1000 UT) capsule  Commonly known as: Vitamin D-3  Medication Adjustments for Surgery: Do Not take on the morning of surgery     metoprolol succinate XL 25 mg 24 hr tablet  Commonly known as: Toprol-XL  Take 1 tablet (25 mg) by mouth once daily. Do not crush or chew.  Medication Adjustments for Surgery: Take on the morning of surgery     multivitamin tablet  Notes to patient: HOLD 7 Days prior to surgery - LD 1/30     rosuvastatin 20 mg tablet  Commonly known as: Crestor  Take 1 tablet (20 mg) by mouth once daily.  Medication Adjustments for Surgery: Take/Use as prescribed                 Concerning above medication instructions - If medication is normally taken at night continue normal schedule - do not take night prior and morning of surgery.       Preoperative Deep Breathing Exercises  Why it is important to do deep breathing exercises before my surgery?  Deep breathing exercises strengthen your breathing muscles.  This helps you to recover after your surgery and decreases the chance of breathing complications.  How are the deep breathing exercises done?  Sit straight with your back supported.  Breathe in deeply and slowly through your nose. Your lower rib cage should expand and your abdomen may move forward.  Hold that breath for 3 to 5 seconds.  Breathe out through pursed lips, slowly and completely.  Rest and repeat 10 times every hour while awake.  Rest longer if you become dizzy or lightheaded.      CONTACT SURGEON'S OFFICE IF YOU DEVELOP:  * Fever  = 100.4 F   * New respiratory symptoms (e.g. cough, shortness of breath, respiratory distress, sore throat)  * Recent loss of taste or smell  *Flu like symptoms such as headache, fatigue or gastrointestinal symptoms  * You develop any open sores, shingles, burning or painful urination   AND/OR:  * You no longer wish to have the surgery.  * Any other personal circumstances change that may lead to the need to cancel or defer this surgery.  *You were admitted to any hospital within one week of your planned procedure.    SMOKING:  *Quitting smoking can make a huge difference to your health and recovery from surgery.    *If you need help with quitting, call [phone]-NOW.    THE DAY OF SURGERY:  *Do not eat any food after midnight the night before surgery/procedure.   *YOU MUST DRINK 14 oz drink clear liquids (i.e. water, black coffee/tea, (no milk or cream) apple juice, and electrolyte drinks (Gatorade)  2 hours before your instructed arrival time to the hospital.  *You may chew gum until  2 hours before your surgery/procedure.    SURGICAL TIME  *You will be contacted between 2 p.m. and 6 p.m. the business day before your surgery with your arrival time.  *If you haven't received a call by 6pm, call [phone].  *Scheduled surgery times may change and you will be notified if this occurs-check your personal voicemail for any updates.    ON THE MORNING OF SURGERY:  *Wear comfortable, loose fitting clothing.   *Do not use moisturizers, creams, lotions or perfume.  *All jewelry and valuables should be left at home.  *Prosthetic devices such as contact lenses, hearing aids, dentures, eyelash extensions, hairpins and body piercing must be removed before surgery.    BRING WITH YOU:  *Photo ID and insurance card  *Current list of medicines and allergies  *Pacemaker/Defibrillator/Heart stent cards  *CPAP machine and mask  *Slings/splints/crutches  *Copy of your complete Advanced Directive/DHPOA-if applicable  *Neurostimulator  implant remote    PARKING AND ARRIVAL:  *Check in at the Main Entrance desk and let them know you are here for surgery.  *You will be directed to the 2nd floor surgical waiting area.    AFTER OUTPATIENT SURGERY:  *A responsible adult MUST accompany you at the time of discharge and stay with you for 24 hours after your surgery.  *You may NOT drive yourself home after surgery.  *You may use a taxi or ride sharing service ([employer], Uber) to return home ONLY if you are accompanied by a friend or family member.  *Instructions for resuming your medications will be provided by your surgeon.

## 2025-01-24 NOTE — CPM/PAT H&P
[facility] Evaluation       Name: Christopher Ramon (Christopher Ramon)  /Age: 1965/59 y.o.     In-Person       Chief Complaint: PSA elevation [R97.20]     HPI    Date of Consult: 25    Referring Provider: Dr. Black    Surgery, Date, and Length: Transperineal Prostate Biopsy; 25; 50 minutes     Christopher Ramon  is a 59 year-old male who presents to the [facility] for perioperative risk assessment prior to surgery. Patient presented with elevated PSA. He reports nocturia 1 to 2 times nightly. MRI 24 showed: 1. PI-RADS 3 finding within the right posterolateral peripheral zone  at the level of the prostatic base.  2. No evidence of enlarged pelvic lymph nodes.          This note was created in part upon personal review of patient's medical records.      Patient is scheduled to have Transperineal Prostate Biopsy     Medical History  Past Medical History:   Diagnosis Date    Elevated PSA     10/15/24 5.34    Hyperlipidemia     Hypertension     Palpitations     Dr. Maza note 24, monitor showed SVT/PAC, no treatment            Surgical History  Past Surgical History:   Procedure Laterality Date    FOOT FRACTURE SURGERY Left     pinning 5th MT    GANGLION CYST EXCISION Left 2023    4 cysts    HERNIA REPAIR Left     Inguinal Hernia Repair    ORCHIECTOMY Left              Family history:  Family History   Problem Relation Name Age of Onset    Diabetes Mother      Heart murmur Father Gint     Hyperlipidemia Father Gint     No Known Problems Sister      No Known Problems Brother      No Known Problems Brother      No Known Problems Brother          Social history:  Social History     Socioeconomic History    Marital status:      Spouse name: Not on file    Number of children: Not on file    Years of education: Not on file    Highest education level: Not on file   Occupational History    Not on file   Tobacco Use    Smoking status: Never    Smokeless tobacco: Never   Vaping Use    Vaping  "status: Never Used   Substance and Sexual Activity    Alcohol use: Yes     Comment: 3/week    Drug use: Never    Sexual activity: Defer   Other Topics Concern    Not on file   Social History Narrative    Not on file     Social Drivers of Health     Financial Resource Strain: Not on file   Food Insecurity: Not on file   Transportation Needs: Not on file   Physical Activity: Not on file   Stress: Not on file   Social Connections: Not on file   Intimate Partner Violence: Not on file   Housing Stability: Not on file        Current Outpatient Medications   Medication Instructions    acetaminophen (TYLENOL) 325 mg, Every 6 hours PRN    aspirin 81 mg, Daily    blood pressure monitor (Blood Pressure Kit) kit USE TO CHECK BLOOD PRESSURE ONCE DAILY    cholecalciferol (VITAMIN D-3) 25 mcg, Daily    metoprolol succinate XL (TOPROL-XL) 25 mg, oral, Daily, Do not crush or chew.    multivitamin tablet 1 tablet, Daily    rosuvastatin (CRESTOR) 20 mg, oral, Daily        Visit Vitals  BP (!) 158/98   Pulse 62   Temp 36.7 °C (98.1 °F) (Temporal)   Resp 18   Ht 1.74 m (5' 8.5\")   Wt 70 kg (154 lb 5.2 oz)   SpO2 100%   BMI 23.12 kg/m²   Smoking Status Never   BSA 1.84 m²         Review of Systems   Constitutional: Negative.    HENT: Negative.     Eyes: Negative.    Respiratory: Negative.     Cardiovascular: Negative.         METS >4  landscape/construction Without chest pain / SOB    Gastrointestinal: Negative.    Endocrine: Negative.    Genitourinary: Negative.    Musculoskeletal: Negative.         Walking boot left foot - recent surgery   Skin: Negative.    Allergic/Immunologic: Negative.    Neurological: Negative.    Hematological: Negative.    Psychiatric/Behavioral: Negative.          Physical Exam  Constitutional:       Appearance: Normal appearance.   HENT:      Head: Normocephalic and atraumatic.      Right Ear: External ear normal.      Left Ear: External ear normal.      Nose: Nose normal.      Mouth/Throat:      Pharynx: " Oropharynx is clear.   Eyes:      Conjunctiva/sclera: Conjunctivae normal.   Cardiovascular:      Rate and Rhythm: Normal rate and regular rhythm.      Heart sounds: Normal heart sounds.   Pulmonary:      Effort: Pulmonary effort is normal.      Breath sounds: Normal breath sounds.   Abdominal:      General: Abdomen is flat.      Palpations: Abdomen is soft.   Musculoskeletal:         General: Normal range of motion.      Cervical back: Normal range of motion and neck supple.   Skin:     General: Skin is warm and dry.   Neurological:      General: No focal deficit present.      Mental Status: He is alert and oriented to person, place, and time.   Psychiatric:         Mood and Affect: Mood normal.         Behavior: Behavior normal.         Thought Content: Thought content normal.         Judgment: Judgment normal.          PAT AIRWAY:   Airway:     Mallampati::  II    Neck ROM::  Full    Lab Results   Component Value Date    WBC 5.5 01/24/2025    HGB 14.5 01/24/2025    HCT 42.3 01/24/2025    MCV 94 01/24/2025     01/24/2025      Lab Results   Component Value Date    GLUCOSE 114 (H) 01/24/2025    CALCIUM 9.3 01/24/2025     (L) 01/24/2025    K 4.3 01/24/2025    CO2 26 01/24/2025    CL 96 (L) 01/24/2025    BUN 16 01/24/2025    CREATININE 0.73 01/24/2025        Encounter Date: 10/08/24   ECG 12 lead (Clinic Performed)    Narrative    NSR      HOLTER 11/6/24  Patient rhythm is normal sinus rhythm with a rate from 46 to max 159  Rare PAC  5 runs of SVT longest being 7 beats  Rare PVC  Rare couplet  No episodes of ventricular tachycardia  1 symptoms noted correlated with normal sinus rhythm    STRESS TEST 11/22/24  Summary:   1. Adequate level of stress achieved.   2. Essentially negative ECG treadmill stress test for ischemia at 101% of maximum predicted heart rate. Patient exercised up to 8 minutes total 10.3 METS. Good exertional capacity. No ischemic ST-T changes seen. Few PVCs noted.   3. No clinical or  electrocardiographic evidence for ischemia at maximal workload.   4. No ischemia by ECG at max workload.   5. Normal Stress Test.      Patient is scheduled to have Transperineal Prostate Biopsy     Cardiovascular  METS: 4   RCRI: 0  , 3.9 % Risk of MACE  Caprini: 3      HTN- metoprolol Continue DOS   HLD- rosuvastatin Continue DOS     Pulmonary  Stop Bang score is 3 placing patient at moderate risk for EJ  ARISCAT: <26 points, 1.6% risk of in-hospital postoperative pulmonary complication  PRODIGY: Moderate risk for opioid induced respiratory depression      Neurology  No neurologic diagnosis or significant findings on chart review, clinical presentation and evaluation.  No grossly apparent neurologic perioperative risk.    Patient is not at increased risk for perioperative CVA       Hematology       Patient instructed to ambulate as soon as possible postoperatively to decrease thromboembolic risk.      Initiate mechanical DVT prophylaxis as soon as possible and initiate chemical prophylaxis when deemed safe from a bleeding standpoint post surgery.       VTE prophylaxis per surgical team         Tests ordered in PAT: cbc, bmp, ua   LABS REVIEWED: hyponatremia 129  Pt will repeat draw next week    Risk assessment complete.  Patient is scheduled for a low surgical risk procedure.        Preoperative medication instructions were provided and reviewed with the patient.  Any additional testing or evaluation was explained to the patient.  Nothing by mouth instructions were discussed and patient's questions were answered prior to conclusion to this encounter.  Patient verbalized understanding of preoperative instructions given in preadmission testing; discharge instructions available in EMR.

## 2025-01-24 NOTE — CPM/PAT NURSE NOTE
CPM/PAT Nurse Note      Name: Christopher Ramon (Christopher Ramon)  /Age: 1965/59 y.o.       Past Medical History:   Diagnosis Date    Elevated PSA     10/15/24 5.34    Hyperlipidemia     Hypertension     Palpitations     Dr. Maza note 24, monitor showed SVT/PAC, no treatment       Past Surgical History:   Procedure Laterality Date    FOOT FRACTURE SURGERY Left     pinning 5th MT    GANGLION CYST EXCISION Left 2023    4 cysts    HERNIA REPAIR Left     Inguinal Hernia Repair    ORCHIECTOMY Left        Patient Sexual activity questions deferred to the physician.    Family History   Problem Relation Name Age of Onset    Diabetes Mother      Heart murmur Father Gint     Hyperlipidemia Father Gint     No Known Problems Sister      No Known Problems Brother      No Known Problems Brother      No Known Problems Brother         Allergies   Allergen Reactions    Chicken Meat Extract GI Upset       Prior to Admission medications    Medication Sig Start Date End Date Taking? Authorizing Provider   acetaminophen (Tylenol) 325 mg tablet Take 1 tablet (325 mg) by mouth every 6 hours if needed for mild pain (1 - 3).    Historical Provider, MD   aspirin 81 mg EC tablet Take 1 tablet (81 mg) by mouth once daily. For 30 days, after ORIF of Left foot,    Historical Provider, MD   blood pressure monitor (Blood Pressure Kit) kit USE TO CHECK BLOOD PRESSURE ONCE DAILY  Patient not taking: Reported on 2025 10/16/24   Gaudencio Caballero PA-C   cholecalciferol (Vitamin D-3) 25 MCG (1000 UT) capsule Take 1 capsule (25 mcg) by mouth once daily.    Historical Provider, MD   metoprolol succinate XL (Toprol-XL) 25 mg 24 hr tablet Take 1 tablet (25 mg) by mouth once daily. Do not crush or chew. 25  Gaudencio Caballero PA-C   multivitamin tablet Take 1 tablet by mouth once daily.    Historical Provider, MD   rosuvastatin (Crestor) 20 mg tablet Take 1 tablet (20 mg) by mouth once daily. 24  Catrachito Maza,  MD MARY LUIS     DASI Risk Score    No data to display       Caprini DVT Assessment    No data to display       Modified Frailty Index    No data to display       CHADS2 Stroke Risk  Current as of 2 days ago        N/A 3 to 100%: High Risk   2 to < 3%: Medium Risk   0 to < 2%: Low Risk     Last Change: N/A          This score determines the patient's risk of having a stroke if the patient has atrial fibrillation.        This score is not applicable to this patient. Components are not calculated.          Revised Cardiac Risk Index    No data to display       Apfel Simplified Score    No data to display       Risk Analysis Index Results This Encounter    No data found in the last 10 encounters.       Prodigy: High Risk  Total Score: 8              Prodigy Gender Score          ARISCAT Score for Postoperative Pulmonary Complications      Flowsheet Row Pre-Admission Testing from 1/24/2025 in [facility] with Kayla Al PA-C   Age Calculated Score 3 filed at 01/24/2025 0950   Preoperative SpO2 0 filed at 01/24/2025 0950   Respiratory infection in the last month Either upper or lower (i.e., URI, bronchitis, pneumonia), with fever and antibiotic treatment 0 filed at 01/24/2025 0950   Preoperative anemia (Hgb less than 10 g/dl) 0 filed at 01/24/2025 0950   Surgical incision  0 filed at 01/24/2025 0950   Duration of surgery  0 filed at 01/24/2025 0950   Emergency Procedure  0 filed at 01/24/2025 0950   ARISCAT Total Score  3 filed at 01/24/2025 0950          Hali Perioperative Risk for Myocardial Infarction or Cardiac Arrest (MARI)    No data to display         Nurse Plan of Action: After Visit Summary (AVS) reviewed and patient verbalized good understanding of medications and NPO instructions.

## 2025-01-24 NOTE — H&P (VIEW-ONLY)
[facility] Evaluation       Name: Christopher Ramon (Christopher Ramon)  /Age: 1965/59 y.o.     In-Person       Chief Complaint: PSA elevation [R97.20]     HPI    Date of Consult: 25    Referring Provider: Dr. Black    Surgery, Date, and Length: Transperineal Prostate Biopsy; 25; 50 minutes     Christopher Ramon  is a 59 year-old male who presents to the [facility] for perioperative risk assessment prior to surgery. Patient presented with elevated PSA. He reports nocturia 1 to 2 times nightly. MRI 24 showed: 1. PI-RADS 3 finding within the right posterolateral peripheral zone  at the level of the prostatic base.  2. No evidence of enlarged pelvic lymph nodes.          This note was created in part upon personal review of patient's medical records.      Patient is scheduled to have Transperineal Prostate Biopsy     Medical History  Past Medical History:   Diagnosis Date    Elevated PSA     10/15/24 5.34    Hyperlipidemia     Hypertension     Palpitations     Dr. Maza note 24, monitor showed SVT/PAC, no treatment            Surgical History  Past Surgical History:   Procedure Laterality Date    FOOT FRACTURE SURGERY Left     pinning 5th MT    GANGLION CYST EXCISION Left 2023    4 cysts    HERNIA REPAIR Left     Inguinal Hernia Repair    ORCHIECTOMY Left              Family history:  Family History   Problem Relation Name Age of Onset    Diabetes Mother      Heart murmur Father Gint     Hyperlipidemia Father Gint     No Known Problems Sister      No Known Problems Brother      No Known Problems Brother      No Known Problems Brother          Social history:  Social History     Socioeconomic History    Marital status:      Spouse name: Not on file    Number of children: Not on file    Years of education: Not on file    Highest education level: Not on file   Occupational History    Not on file   Tobacco Use    Smoking status: Never    Smokeless tobacco: Never   Vaping Use    Vaping  "status: Never Used   Substance and Sexual Activity    Alcohol use: Yes     Comment: 3/week    Drug use: Never    Sexual activity: Defer   Other Topics Concern    Not on file   Social History Narrative    Not on file     Social Drivers of Health     Financial Resource Strain: Not on file   Food Insecurity: Not on file   Transportation Needs: Not on file   Physical Activity: Not on file   Stress: Not on file   Social Connections: Not on file   Intimate Partner Violence: Not on file   Housing Stability: Not on file        Current Outpatient Medications   Medication Instructions    acetaminophen (TYLENOL) 325 mg, Every 6 hours PRN    aspirin 81 mg, Daily    blood pressure monitor (Blood Pressure Kit) kit USE TO CHECK BLOOD PRESSURE ONCE DAILY    cholecalciferol (VITAMIN D-3) 25 mcg, Daily    metoprolol succinate XL (TOPROL-XL) 25 mg, oral, Daily, Do not crush or chew.    multivitamin tablet 1 tablet, Daily    rosuvastatin (CRESTOR) 20 mg, oral, Daily        Visit Vitals  BP (!) 158/98   Pulse 62   Temp 36.7 °C (98.1 °F) (Temporal)   Resp 18   Ht 1.74 m (5' 8.5\")   Wt 70 kg (154 lb 5.2 oz)   SpO2 100%   BMI 23.12 kg/m²   Smoking Status Never   BSA 1.84 m²         Review of Systems   Constitutional: Negative.    HENT: Negative.     Eyes: Negative.    Respiratory: Negative.     Cardiovascular: Negative.         METS >4  landscape/construction Without chest pain / SOB    Gastrointestinal: Negative.    Endocrine: Negative.    Genitourinary: Negative.    Musculoskeletal: Negative.         Walking boot left foot - recent surgery   Skin: Negative.    Allergic/Immunologic: Negative.    Neurological: Negative.    Hematological: Negative.    Psychiatric/Behavioral: Negative.          Physical Exam  Constitutional:       Appearance: Normal appearance.   HENT:      Head: Normocephalic and atraumatic.      Right Ear: External ear normal.      Left Ear: External ear normal.      Nose: Nose normal.      Mouth/Throat:      Pharynx: " Oropharynx is clear.   Eyes:      Conjunctiva/sclera: Conjunctivae normal.   Cardiovascular:      Rate and Rhythm: Normal rate and regular rhythm.      Heart sounds: Normal heart sounds.   Pulmonary:      Effort: Pulmonary effort is normal.      Breath sounds: Normal breath sounds.   Abdominal:      General: Abdomen is flat.      Palpations: Abdomen is soft.   Musculoskeletal:         General: Normal range of motion.      Cervical back: Normal range of motion and neck supple.   Skin:     General: Skin is warm and dry.   Neurological:      General: No focal deficit present.      Mental Status: He is alert and oriented to person, place, and time.   Psychiatric:         Mood and Affect: Mood normal.         Behavior: Behavior normal.         Thought Content: Thought content normal.         Judgment: Judgment normal.          PAT AIRWAY:   Airway:     Mallampati::  II    Neck ROM::  Full    Lab Results   Component Value Date    WBC 5.5 01/24/2025    HGB 14.5 01/24/2025    HCT 42.3 01/24/2025    MCV 94 01/24/2025     01/24/2025      Lab Results   Component Value Date    GLUCOSE 114 (H) 01/24/2025    CALCIUM 9.3 01/24/2025     01/30/2025    K 4.3 01/24/2025    CO2 26 01/24/2025    CL 96 (L) 01/24/2025    BUN 16 01/24/2025    CREATININE 0.73 01/24/2025      Repeat Na level 1/29/25  137      Encounter Date: 10/08/24   ECG 12 lead (Clinic Performed)    Narrative    NSR      HOLTER 11/6/24  Patient rhythm is normal sinus rhythm with a rate from 46 to max 159  Rare PAC  5 runs of SVT longest being 7 beats  Rare PVC  Rare couplet  No episodes of ventricular tachycardia  1 symptoms noted correlated with normal sinus rhythm    STRESS TEST 11/22/24  Summary:   1. Adequate level of stress achieved.   2. Essentially negative ECG treadmill stress test for ischemia at 101% of maximum predicted heart rate. Patient exercised up to 8 minutes total 10.3 METS. Good exertional capacity. No ischemic ST-T changes seen. Few PVCs  noted.   3. No clinical or electrocardiographic evidence for ischemia at maximal workload.   4. No ischemia by ECG at max workload.   5. Normal Stress Test.      Patient is scheduled to have Transperineal Prostate Biopsy     Cardiovascular  METS: 4   RCRI: 0  , 3.9 % Risk of MACE  Caprini: 3      HTN- metoprolol Continue DOS   HLD- rosuvastatin Continue DOS     Pulmonary  Stop Bang score is 3 placing patient at moderate risk for EJ  ARISCAT: <26 points, 1.6% risk of in-hospital postoperative pulmonary complication  PRODIGY: Moderate risk for opioid induced respiratory depression      Neurology  No neurologic diagnosis or significant findings on chart review, clinical presentation and evaluation.  No grossly apparent neurologic perioperative risk.    Patient is not at increased risk for perioperative CVA       Hematology       Patient instructed to ambulate as soon as possible postoperatively to decrease thromboembolic risk.      Initiate mechanical DVT prophylaxis as soon as possible and initiate chemical prophylaxis when deemed safe from a bleeding standpoint post surgery.       VTE prophylaxis per surgical team         Tests ordered in PAT: cbc, bmp, ua   LABS REVIEWED: hyponatremia 129  Pt will repeat draw next week - repeat 137    Risk assessment complete.  Patient is scheduled for a low surgical risk procedure.        Preoperative medication instructions were provided and reviewed with the patient.  Any additional testing or evaluation was explained to the patient.  Nothing by mouth instructions were discussed and patient's questions were answered prior to conclusion to this encounter.  Patient verbalized understanding of preoperative instructions given in preadmission testing; discharge instructions available in EMR.

## 2025-01-30 ENCOUNTER — LAB (OUTPATIENT)
Dept: LAB | Facility: HOSPITAL | Age: 60
End: 2025-01-30
Payer: COMMERCIAL

## 2025-01-30 LAB — SODIUM SERPL-SCNC: 137 MMOL/L (ref 136–145)

## 2025-01-30 PROCEDURE — 84295 ASSAY OF SERUM SODIUM: CPT

## 2025-01-31 ENCOUNTER — TELEPHONE (OUTPATIENT)
Dept: UROLOGY | Facility: HOSPITAL | Age: 60
End: 2025-01-31
Payer: COMMERCIAL

## 2025-01-31 NOTE — TELEPHONE ENCOUNTER
Spoke with patient; reviewed instructions for procedure on 2-7-25.  Patient is not on blood thinners.    Jumana Gonzalez LPN

## 2025-02-05 ENCOUNTER — TELEPHONE (OUTPATIENT)
Dept: UROLOGY | Facility: CLINIC | Age: 60
End: 2025-02-05
Payer: COMMERCIAL

## 2025-02-06 ENCOUNTER — ANESTHESIA EVENT (OUTPATIENT)
Dept: OPERATING ROOM | Facility: HOSPITAL | Age: 60
End: 2025-02-06
Payer: COMMERCIAL

## 2025-02-07 ENCOUNTER — HOSPITAL ENCOUNTER (OUTPATIENT)
Facility: HOSPITAL | Age: 60
Setting detail: OUTPATIENT SURGERY
Discharge: HOME | End: 2025-02-07
Attending: STUDENT IN AN ORGANIZED HEALTH CARE EDUCATION/TRAINING PROGRAM | Admitting: STUDENT IN AN ORGANIZED HEALTH CARE EDUCATION/TRAINING PROGRAM
Payer: COMMERCIAL

## 2025-02-07 ENCOUNTER — ANESTHESIA (OUTPATIENT)
Dept: OPERATING ROOM | Facility: HOSPITAL | Age: 60
End: 2025-02-07
Payer: COMMERCIAL

## 2025-02-07 VITALS
RESPIRATION RATE: 15 BRPM | SYSTOLIC BLOOD PRESSURE: 141 MMHG | BODY MASS INDEX: 24.48 KG/M2 | DIASTOLIC BLOOD PRESSURE: 88 MMHG | TEMPERATURE: 97 F | OXYGEN SATURATION: 99 % | HEART RATE: 57 BPM | WEIGHT: 163.36 LBS

## 2025-02-07 DIAGNOSIS — R97.20 PSA ELEVATION: ICD-10-CM

## 2025-02-07 PROCEDURE — 3700000001 HC GENERAL ANESTHESIA TIME - INITIAL BASE CHARGE: Performed by: STUDENT IN AN ORGANIZED HEALTH CARE EDUCATION/TRAINING PROGRAM

## 2025-02-07 PROCEDURE — 7100000009 HC PHASE TWO TIME - INITIAL BASE CHARGE: Performed by: STUDENT IN AN ORGANIZED HEALTH CARE EDUCATION/TRAINING PROGRAM

## 2025-02-07 PROCEDURE — 76872 US TRANSRECTAL: CPT | Performed by: STUDENT IN AN ORGANIZED HEALTH CARE EDUCATION/TRAINING PROGRAM

## 2025-02-07 PROCEDURE — 7100000001 HC RECOVERY ROOM TIME - INITIAL BASE CHARGE: Performed by: STUDENT IN AN ORGANIZED HEALTH CARE EDUCATION/TRAINING PROGRAM

## 2025-02-07 PROCEDURE — 3600000007 HC OR TIME - EACH INCREMENTAL 1 MINUTE - PROCEDURE LEVEL TWO: Performed by: STUDENT IN AN ORGANIZED HEALTH CARE EDUCATION/TRAINING PROGRAM

## 2025-02-07 PROCEDURE — 3600000002 HC OR TIME - INITIAL BASE CHARGE - PROCEDURE LEVEL TWO: Performed by: STUDENT IN AN ORGANIZED HEALTH CARE EDUCATION/TRAINING PROGRAM

## 2025-02-07 PROCEDURE — 2720000007 HC OR 272 NO HCPCS: Performed by: STUDENT IN AN ORGANIZED HEALTH CARE EDUCATION/TRAINING PROGRAM

## 2025-02-07 PROCEDURE — 3700000002 HC GENERAL ANESTHESIA TIME - EACH INCREMENTAL 1 MINUTE: Performed by: STUDENT IN AN ORGANIZED HEALTH CARE EDUCATION/TRAINING PROGRAM

## 2025-02-07 PROCEDURE — C1819 TISSUE LOCALIZATION-EXCISION: HCPCS | Performed by: STUDENT IN AN ORGANIZED HEALTH CARE EDUCATION/TRAINING PROGRAM

## 2025-02-07 PROCEDURE — 76942 ECHO GUIDE FOR BIOPSY: CPT | Performed by: STUDENT IN AN ORGANIZED HEALTH CARE EDUCATION/TRAINING PROGRAM

## 2025-02-07 PROCEDURE — A4649 SURGICAL SUPPLIES: HCPCS | Performed by: STUDENT IN AN ORGANIZED HEALTH CARE EDUCATION/TRAINING PROGRAM

## 2025-02-07 PROCEDURE — 2500000004 HC RX 250 GENERAL PHARMACY W/ HCPCS (ALT 636 FOR OP/ED)

## 2025-02-07 PROCEDURE — 7100000002 HC RECOVERY ROOM TIME - EACH INCREMENTAL 1 MINUTE: Performed by: STUDENT IN AN ORGANIZED HEALTH CARE EDUCATION/TRAINING PROGRAM

## 2025-02-07 PROCEDURE — 7100000010 HC PHASE TWO TIME - EACH INCREMENTAL 1 MINUTE: Performed by: STUDENT IN AN ORGANIZED HEALTH CARE EDUCATION/TRAINING PROGRAM

## 2025-02-07 PROCEDURE — 55700 PR PROSTATE NEEDLE BIOPSY ANY APPROACH: CPT | Performed by: STUDENT IN AN ORGANIZED HEALTH CARE EDUCATION/TRAINING PROGRAM

## 2025-02-07 RX ORDER — GENTAMICIN 40 MG/ML
INJECTION, SOLUTION INTRAMUSCULAR; INTRAVENOUS AS NEEDED
Status: DISCONTINUED | OUTPATIENT
Start: 2025-02-07 | End: 2025-02-07

## 2025-02-07 RX ORDER — OXYCODONE HYDROCHLORIDE 5 MG/1
10 TABLET ORAL EVERY 4 HOURS PRN
Status: DISCONTINUED | OUTPATIENT
Start: 2025-02-07 | End: 2025-02-07 | Stop reason: HOSPADM

## 2025-02-07 RX ORDER — CIPROFLOXACIN 500 MG/1
500 TABLET ORAL 2 TIMES DAILY
COMMUNITY

## 2025-02-07 RX ORDER — MIDAZOLAM HYDROCHLORIDE 1 MG/ML
INJECTION INTRAMUSCULAR; INTRAVENOUS AS NEEDED
Status: DISCONTINUED | OUTPATIENT
Start: 2025-02-07 | End: 2025-02-07

## 2025-02-07 RX ORDER — ONDANSETRON HYDROCHLORIDE 2 MG/ML
4 INJECTION, SOLUTION INTRAVENOUS ONCE AS NEEDED
Status: DISCONTINUED | OUTPATIENT
Start: 2025-02-07 | End: 2025-02-07 | Stop reason: HOSPADM

## 2025-02-07 RX ORDER — FENTANYL CITRATE 50 UG/ML
INJECTION, SOLUTION INTRAMUSCULAR; INTRAVENOUS AS NEEDED
Status: DISCONTINUED | OUTPATIENT
Start: 2025-02-07 | End: 2025-02-07

## 2025-02-07 RX ORDER — METOCLOPRAMIDE HYDROCHLORIDE 5 MG/ML
10 INJECTION INTRAMUSCULAR; INTRAVENOUS ONCE AS NEEDED
Status: DISCONTINUED | OUTPATIENT
Start: 2025-02-07 | End: 2025-02-07 | Stop reason: HOSPADM

## 2025-02-07 RX ORDER — LIDOCAINE HYDROCHLORIDE 10 MG/ML
0.1 INJECTION, SOLUTION EPIDURAL; INFILTRATION; INTRACAUDAL; PERINEURAL ONCE
Status: DISCONTINUED | OUTPATIENT
Start: 2025-02-07 | End: 2025-02-07 | Stop reason: HOSPADM

## 2025-02-07 RX ORDER — PROPOFOL 10 MG/ML
INJECTION, EMULSION INTRAVENOUS AS NEEDED
Status: DISCONTINUED | OUTPATIENT
Start: 2025-02-07 | End: 2025-02-07

## 2025-02-07 RX ORDER — POVIDONE-IODINE 10 %
SOLUTION, NON-ORAL TOPICAL AS NEEDED
Status: DISCONTINUED | OUTPATIENT
Start: 2025-02-07 | End: 2025-02-07 | Stop reason: HOSPADM

## 2025-02-07 RX ORDER — LIDOCAINE HYDROCHLORIDE 20 MG/ML
INJECTION, SOLUTION EPIDURAL; INFILTRATION; INTRACAUDAL; PERINEURAL AS NEEDED
Status: DISCONTINUED | OUTPATIENT
Start: 2025-02-07 | End: 2025-02-07

## 2025-02-07 RX ORDER — OXYCODONE HYDROCHLORIDE 5 MG/1
5 TABLET ORAL EVERY 4 HOURS PRN
Status: DISCONTINUED | OUTPATIENT
Start: 2025-02-07 | End: 2025-02-07 | Stop reason: HOSPADM

## 2025-02-07 RX ORDER — ONDANSETRON HYDROCHLORIDE 2 MG/ML
INJECTION, SOLUTION INTRAVENOUS AS NEEDED
Status: DISCONTINUED | OUTPATIENT
Start: 2025-02-07 | End: 2025-02-07

## 2025-02-07 RX ADMIN — ONDANSETRON 4 MG: 2 INJECTION INTRAMUSCULAR; INTRAVENOUS at 09:43

## 2025-02-07 RX ADMIN — PROPOFOL 150 MCG/KG/MIN: 10 INJECTION, EMULSION INTRAVENOUS at 09:38

## 2025-02-07 RX ADMIN — SODIUM CHLORIDE: 9 INJECTION, SOLUTION INTRAVENOUS at 09:30

## 2025-02-07 RX ADMIN — PROPOFOL 50 MG: 10 INJECTION, EMULSION INTRAVENOUS at 09:37

## 2025-02-07 RX ADMIN — PROPOFOL 50 MG: 10 INJECTION, EMULSION INTRAVENOUS at 09:35

## 2025-02-07 RX ADMIN — MIDAZOLAM HYDROCHLORIDE 2 MG: 1 INJECTION, SOLUTION INTRAMUSCULAR; INTRAVENOUS at 09:25

## 2025-02-07 RX ADMIN — GENTAMICIN SULFATE 80 MG: 40 INJECTION, SOLUTION INTRAMUSCULAR; INTRAVENOUS at 09:40

## 2025-02-07 RX ADMIN — FENTANYL CITRATE 50 MCG: 50 INJECTION, SOLUTION INTRAMUSCULAR; INTRAVENOUS at 09:35

## 2025-02-07 RX ADMIN — LIDOCAINE HYDROCHLORIDE 100 MG: 20 INJECTION, SOLUTION EPIDURAL; INFILTRATION; INTRACAUDAL; PERINEURAL at 09:35

## 2025-02-07 SDOH — HEALTH STABILITY: MENTAL HEALTH: CURRENT SMOKER: 0

## 2025-02-07 ASSESSMENT — PAIN - FUNCTIONAL ASSESSMENT
PAIN_FUNCTIONAL_ASSESSMENT: 0-10

## 2025-02-07 ASSESSMENT — COLUMBIA-SUICIDE SEVERITY RATING SCALE - C-SSRS
1. IN THE PAST MONTH, HAVE YOU WISHED YOU WERE DEAD OR WISHED YOU COULD GO TO SLEEP AND NOT WAKE UP?: NO
6. HAVE YOU EVER DONE ANYTHING, STARTED TO DO ANYTHING, OR PREPARED TO DO ANYTHING TO END YOUR LIFE?: NO
2. HAVE YOU ACTUALLY HAD ANY THOUGHTS OF KILLING YOURSELF?: NO

## 2025-02-07 ASSESSMENT — PAIN SCALES - GENERAL
PAINLEVEL_OUTOF10: 0 - NO PAIN

## 2025-02-07 NOTE — ANESTHESIA POSTPROCEDURE EVALUATION
Patient: Christopher Ramon    Procedure Summary       Date: 02/07/25 Room / Location: [facility] A OR 01 / [facility] A OR    Anesthesia Start: 0929 Anesthesia Stop: 1004    Procedure: Transperineal Prostate Biopsy (Prostate) Diagnosis:       PSA elevation      (PSA elevation [R97.20])    Surgeons: Janak Black MD MPH Responsible Provider: Bernie Amaya MD    Anesthesia Type: MAC ASA Status: 2            Anesthesia Type: MAC    Vitals Value Taken Time   /84 02/07/25 1016   Temp 36.2 °C (97.2 °F) 02/07/25 1000   Pulse 69 02/07/25 1025   Resp 16 02/07/25 1015   SpO2 91 % 02/07/25 1025   Vitals shown include unfiled device data.    Anesthesia Post Evaluation    Patient participation: complete - patient participated  Level of consciousness: awake and alert  Pain management: adequate  Airway patency: patent  Cardiovascular status: acceptable  Respiratory status: acceptable  Hydration status: acceptable  Postoperative Nausea and Vomiting: none    No notable events documented.

## 2025-02-07 NOTE — ANESTHESIA PREPROCEDURE EVALUATION
Patient: Christopher Ramon    Procedure Information       Date/Time: 02/07/25 0900    Procedure: Transperineal Prostate Biopsy (Prostate)    Location: [facility] A OR 01 / [facility] A OR    Surgeons: Janak Black MD MPH          Vitals:    02/07/25 0801   BP: 154/90   Pulse: 66   Resp: 18   Temp: 36.4 °C (97.5 °F)   SpO2: 99%       Past Surgical History:   Procedure Laterality Date   • FOOT FRACTURE SURGERY Left     pinning 5th MT   • GANGLION CYST EXCISION Left 12/2023    4 cysts   • HERNIA REPAIR Left     Inguinal Hernia Repair   • ORCHIECTOMY Left      Past Medical History:   Diagnosis Date   • Elevated PSA     10/15/24 5.34   • Hyperlipidemia    • Hypertension    • Palpitations     Dr. Maza note 12/18/24, monitor showed SVT/PAC, no treatment     No current facility-administered medications for this encounter.  Prior to Admission medications    Medication Sig Start Date End Date Taking? Authorizing Provider   acetaminophen (Tylenol) 325 mg tablet Take 1 tablet (325 mg) by mouth every 6 hours if needed for mild pain (1 - 3).   Yes Historical Provider, MD   aspirin 81 mg EC tablet Take 1 tablet (81 mg) by mouth once daily. For 30 days, after ORIF of Left foot,   Yes Historical Provider, MD   blood pressure monitor (Blood Pressure Kit) kit USE TO CHECK BLOOD PRESSURE ONCE DAILY 10/16/24  Yes Gaudencio Caballero PA-C   cholecalciferol (Vitamin D-3) 25 MCG (1000 UT) capsule Take 1 capsule (25 mcg) by mouth once daily.   Yes Historical Provider, MD   ciprofloxacin (Cipro) 500 mg tablet Take 1 tablet (500 mg) by mouth 2 times a day.   Yes Historical Provider, MD   metoprolol succinate XL (Toprol-XL) 25 mg 24 hr tablet Take 1 tablet (25 mg) by mouth once daily. Do not crush or chew. 1/6/25 7/5/25 Yes Gaudencio Caballero PA-C   multivitamin tablet Take 1 tablet by mouth once daily.   Yes Historical Provider, MD   rosuvastatin (Crestor) 20 mg tablet Take 1 tablet (20 mg) by mouth once daily. 11/6/24 12/11/25 Yes Catrachito Maza,  "MD     Allergies   Allergen Reactions   • Chicken Meat Extract GI Upset     Social History     Tobacco Use   • Smoking status: Never   • Smokeless tobacco: Never   Substance Use Topics   • Alcohol use: Yes     Comment: 3/week         Chemistry    Lab Results   Component Value Date/Time     01/30/2025 0805    K 4.3 01/24/2025 1031    CL 96 (L) 01/24/2025 1031    CO2 26 01/24/2025 1031    BUN 16 01/24/2025 1031    CREATININE 0.73 01/24/2025 1031    Lab Results   Component Value Date/Time    CALCIUM 9.3 01/24/2025 1031    ALKPHOS 30 (L) 10/15/2024 0826    AST 22 10/15/2024 0826    ALT 24 10/15/2024 0826    BILITOT 0.9 10/15/2024 0826          Lab Results   Component Value Date/Time    WBC 5.5 01/24/2025 1031    HGB 14.5 01/24/2025 1031    HCT 42.3 01/24/2025 1031     01/24/2025 1031     No results found for: \"PROTIME\", \"PTT\", \"INR\"  Encounter Date: 10/08/24   ECG 12 lead (Clinic Performed)    Narrative    NSR     No results found for this or any previous visit from the past 1095 days.      Relevant Problems   Cardiac   (+) Primary hypertension   (+) Pure hypercholesterolemia      Neuro   (+) Chronic nonintractable headache      HEENT   (+) Acute non-recurrent frontal sinusitis   (+) Sinus pressure       Clinical information reviewed:    Allergies                NPO Detail:  NPO/Void Status  Carbohydrate Drink Given Prior to Surgery? : N  Date of Last Liquid: 02/07/25  Time of Last Liquid: 0500  Date of Last Solid: 02/06/25  Time of Last Solid: 2000  Last Intake Type: Clear fluids  Time of Last Void: 1600         Physical Exam    Airway  Mallampati: I  TM distance: >3 FB  Neck ROM: full     Cardiovascular   Rhythm: regular  Rate: normal     Dental - normal exam     Pulmonary   Breath sounds clear to auscultation     Abdominal        Anesthesia Plan    History of general anesthesia?: yes  History of complications of general anesthesia?: no    ASA 2     MAC     The patient is not a current " smoker.    intravenous induction   Anesthetic plan and risks discussed with patient.    Plan discussed with CRNA.

## 2025-02-07 NOTE — INTERVAL H&P NOTE
H&P reviewed. The patient was examined and there are no changes to the H&P.    I explained to him the pathophysiology, differential diagnosis, risk factor, associated conditions, and management. I explained to him that elevated PSA could be either due to BPH, prostate infection or inflammation or prostate adenocarcinoma. We discussed the need to do a work-up to rule out any underlying prostate adenocarcinoma in the form of MR fusion biopsy of the prostate. We discussed at length the risk, benefit, potential complication, adverse events of prostate biopsy including pain, discomfort, urinary retention, hematuria, pneumaturia, hematospermia. Explained to him that there is a 2% to 5% risk of complicated urinary infection and urosepsis. Explained to him indicates it happens, he will need to be admitted for IV antibiotic for 2 to 3 days at the hospital. He verbalized understanding and would like to proceed.

## 2025-02-07 NOTE — OP NOTE
Transperineal Prostate Biopsy Operative Note     Date: 2025  OR Location: [facility] OR    Name: Christopher Ramon, : 1965, Age: 59 y.o., MRN: [id number], Sex: male    Diagnosis  Pre-op Diagnosis      * PSA elevation [R97.20] Post-op Diagnosis     * PSA elevation [R97.20]     Procedures  Transperineal Prostate Biopsy  11166 - GA PROSTATE NEEDLE BIOPSY ANY APPROACH      Surgeons      * Janak Black - Primary    Resident/Fellow/Other Assistant:  Surgeons and Role:  * No surgeons found with a matching role *    Staff:   Circulator: Jackie Cardozo Person: Emily    Anesthesia Staff: Anesthesiologist: Bernie Amaya MD  CRNA: NATALIA Sanchez-CRNA    Procedure Summary  Anesthesia: Monitor Anesthesia Care  ASA: II  Estimated Blood Loss: 5mL  Intra-op Medications: Administrations occurring from 0900 to 0950 on 25:  * No intraprocedure medications in log *           Anesthesia Record               Intraprocedure I/O Totals       None           Specimen:   ID Type Source Tests Collected by Time   1 : LEFT APEX Tissue PROSTATE, BIOPSY, LEFT APEX SURGICAL PATHOLOGY EXAM Janak Black MD MPH 2025   2 : ARIELLE #1 Tissue PROSTATE BIOPSY TARGETED ARIELLE SURGICAL PATHOLOGY EXAM Janak Black MD MPH 2025   3 : LEFT BASE Tissue PROSTATE, BIOPSY, LEFT BASE SURGICAL PATHOLOGY EXAM Janak Black MD MPH 2025   4 : RIGHT MID Tissue PROSTATE, BIOPSY, RIGHT MID SURGICAL PATHOLOGY EXAM Janak Black MD MPH 2025   5 : RIGHT BASE Tissue PROSTATE, BIOPSY, RIGHT BASE SURGICAL PATHOLOGY EXAM Janak Black MD MPH 2025   6 : LEFT MID Tissue PROSTATE, BIOPSY, LEFT MID SURGICAL PATHOLOGY EXAM aJnak Black MD MPH 2025   7 : RIGHT APEX Tissue PROSTATE, BIOPSY, RIGHT APEX SURGICAL PATHOLOGY EXAM Janak Black MD MPH 2025          Indications: Christopher Ramon is an 59 y.o. male who is having surgery for PSA elevation [R97.20].     The  patient was seen in the preoperative area. The risks, benefits, complications, treatment options, non-operative alternatives, expected recovery and outcomes were discussed with the patient. The possibilities of reaction to medication, pulmonary aspiration, injury to surrounding structures, bleeding, recurrent infection, the need for additional procedures, failure to diagnose a condition, and creating a complication requiring transfusion or operation were discussed with the patient. The patient concurred with the proposed plan, giving informed consent.  The site of surgery was properly noted/marked if necessary per policy. The patient has been actively warmed in preoperative area. Preoperative antibiotics have been ordered and given within 1 hours of incision. Venous thrombosis prophylaxis have been ordered including bilateral sequential compression devices    Procedure Details:     Patient was consented in the preoperative area. Questions were answered. Allergies were reviewed. Perioperative antibiotics were administered. Patient was brought to the OR and placed in supine position on the OR table. A timeout was performed, all were in agreement. Patient underwent MAC anesthesia without complication. He was repositioned in left lateral decubitus.     The Uronav machine was positioned over the patient.     Preoperative MRI was matched to real-time ultrasound using the Uronav. We started with a standard template biopsy of 12 core biopsies from bilateral base, mid, and apex of prostate in lateral and medial positions. The Uronav was used to target 2 region of interest. 4 cores were taken from each region of interest for total of 8. Total biopsies number was 20.    PS 29gs     The ultrasound probe was removed. Pressure was held on the rectum until hemostasis was achieved. This concluded the procedure. Patient was awoken from anesthesia without complication and transferred to PACU in stable condition.     Complications:   None; patient tolerated the procedure well.    Disposition: PACU - hemodynamically stable.  Condition: stable      Fu in 3 weeks with path results       Attending Attestation:     Janak Black  Phone Number: [phone]

## 2025-02-10 ASSESSMENT — PAIN SCALES - GENERAL: PAINLEVEL_OUTOF10: 0 - NO PAIN

## 2025-02-26 NOTE — PROGRESS NOTES
Subjective   Patient ID: Christopher Ramon is a 59 y.o. male    HPI  59 y.o. male who presents for a 3 week follow-up visit s/p MR fusion prostate biopsy 02/07/2025 with elevated PSA. He states he has not any any symptoms as of today. He has 1 right testicle. He reports having a mass on left testicle. He states the mass was removed but it grew back, and a left orchiectomy was performed. He reports minimum problems with erection. He reports nocturia 1 to 2 times nightly. Most recent PSA is 5.34 (10/15/2024). MRI (12/11/2024) showed PI-RADS 3 lesion within the right posterolateral peripheral zone at the level of the prostatic base.         The most recent Surgical Pathology Exam, conducted on 02/07/2025, revealed:  FINAL DIAGNOSIS  A. Prostate, left apex, biopsy:  -- Benign prostatic tissue     B. Prostate, region of interest #1, biopsy:  -- Benign prostatic tissue     C. Prostate, left base, biopsy:  -- Benign prostatic tissue with focal chronic inflammation     D. Prostate, right mid, biopsy:  -- Benign prostatic tissue     E. Prostate, right base, biopsy:  -- Benign prostatic tissue     F. Prostate, left mid, biopsy:  -- Benign prostatic tissue     G. Prostate, right apex, biopsy:  -- Benign prostatic tissue with focal chronic inflammation      Review of Systems    All systems were reviewed. Anything negative was noted in the HPI.    Objective   Physical Exam    General: Well developed, well nourished, alert and cooperative, appears in no acute distress   Eyes: Non-injected conjunctiva, sclera clear, no proptosis   Cardiac: Extremities are warm and well perfused. No edema, cyanosis or pallor   Lungs: Breathing is easy, non-labored. Speaking in clear and complete sentences. Normal diaphragmatic movement   MSK: Ambulatory with steady gait, unassisted   Neuro: Alert and oriented to person, place, and time   Psych: Demonstrates good judgment and reason, without hallucinations, abnormal affect or abnormal behaviors    Skin: No obvious lesions, no rashes       No CVA tenderness bilaterally   No suprapubic pain or discomfort       Past Medical History:   Diagnosis Date    Elevated PSA     10/15/24 5.34    Hyperlipidemia     Hypertension     Palpitations     Dr. Maza note 12/18/24, monitor showed SVT/PAC, no treatment         Past Surgical History:   Procedure Laterality Date    FOOT FRACTURE SURGERY Left     pinning 5th MT    GANGLION CYST EXCISION Left 12/2023    4 cysts    HERNIA REPAIR Left     Inguinal Hernia Repair    ORCHIECTOMY Left          Assessment/Plan   Elevated PSA, PI-RADS 3 lesion on MR, negatove MR Fusion bx     59 y.o. male who presents for the above condition, We discussed the risk, benefit, potential complication, adverse event, survival benefit, quality of life of each option of management. We had a very long and extensive discussion with the patient regarding elevated PSA. I explained to him the pathophysiology, differential diagnosis, risk factor, associated conditions, and management. I explained to him that elevated PSA could be either due to BPH, prostate infection or inflammation or prostate adenocarcinoma. We discussed the need to do a work-up to rule out any underlying prostate adenocarcinoma in the form of MR fusion biopsy if his MRI is positive or regular TRUS biopsy of the MRI is negative or we cannot do an MRI of the prostate in the future.      - Provided pt with surgical intervention and observation options.    Plan:  - Confirm genetic test  - PSA in 6 months         E&M visit today is associated with current or anticipated ongoing medical care services related to a patient's single, serious condition or a complex condition.     2/27/2025    Scribe Attestation  By signing my name below, I, Pedro Elam attest that this documentation has been prepared under the direction and in the presence of Dr. Janak Black.

## 2025-02-27 ENCOUNTER — OFFICE VISIT (OUTPATIENT)
Dept: UROLOGY | Facility: HOSPITAL | Age: 60
End: 2025-02-27
Payer: COMMERCIAL

## 2025-02-27 DIAGNOSIS — R97.20 ELEVATED PSA: Primary | ICD-10-CM

## 2025-02-27 PROCEDURE — 99214 OFFICE O/P EST MOD 30 MIN: CPT | Performed by: STUDENT IN AN ORGANIZED HEALTH CARE EDUCATION/TRAINING PROGRAM

## 2025-02-27 PROCEDURE — G2211 COMPLEX E/M VISIT ADD ON: HCPCS | Performed by: STUDENT IN AN ORGANIZED HEALTH CARE EDUCATION/TRAINING PROGRAM

## 2025-02-28 LAB
CHOLEST SERPL-MCNC: 181 MG/DL
CHOLEST/HDLC SERPL: 2.6 (CALC)
HDLC SERPL-MCNC: 69 MG/DL
LDLC SERPL CALC-MCNC: 97 MG/DL (CALC)
NONHDLC SERPL-MCNC: 112 MG/DL (CALC)
TRIGL SERPL-MCNC: 68 MG/DL

## 2025-06-02 ASSESSMENT — ENCOUNTER SYMPTOMS
HEADACHES: 1
NECK PAIN: 0

## 2025-06-02 NOTE — PROGRESS NOTES
Subjective   Patient ID:   Christopher Ramon is a 59 y.o. male who presents for Follow-up.    Headache:  This has improved.  I gave Prednisone and Meloxicam previously.  Symptoms x months.  Comes and goes.  Having increased fatigue.  Fatigue labs were largely normal in Oct 2024.  Consider head CT vs. Sinus medicine.    Palpitations:  Off and on.  No specific trigger.  No significant heart history.  EKG was normal in Oct 2024.  Seeing cardiology.  Had a normal stress test in Nov 2024.    HTN:  Taking Metoprolol.  BP is 132/88.  Denies CP, SOB.  Denies dizziness, headaches.    Tick bites:  Had negative lyme disease titer in Oct 2024.  Works outside for a living.  No specific symptoms pertaining to this.    Elevated PSA:  Had a biopsy in Feb 2025 - looks like everything was benign.  Seen in Oct 2024.  Seeing urology now.    HLD:  Taking Crestor 20mg - this caused muscle cramping, so he stopped it.  Consider lowering the dose, changing to Simvastatin, or trying Fenofibrate.  Last checked Oct 2024.    Health maintenance:  Smoking: Never a smoker.  PSA (50+): Oct 2024 - seeing urology.  Labs: Oct 2024  Colonoscopy (50-75): DUE - ordered last visit.  Influenza:    Review of Systems   Musculoskeletal:  Negative for neck pain.   Neurological:  Positive for headaches.     12 point review of systems negative unless stated above in HPI    Vitals:    06/11/25 0705   BP: 132/88   Pulse: 52   SpO2: 98%     Physical Exam  General: Alert and oriented, well nourished, no acute distress.  Lungs: Clear to auscultation, non-labored respiration.  Heart: Normal rate, regular rhythm, no murmur, gallop or edema.  Neurologic: Awake, alert, and oriented X3, CN II-XII intact.  Psychiatric: Cooperative, appropriate mood and affect.    Assessment/Plan   It was good seeing you!  Let's stop the Crestor for now.  Consider lowering the dose, changing to Simvastatin, or trying Fenofibrate.  We will re-check cholesterol at next visit.  Continue  specialty care.  I have ordered cologuard to be done for colon cancer screening.  This will be sent directly to your home.  Continue the same medications.  Chronic conditions are stable.  Call with questions or concerns.    Follow up  In Oct for physical  Diagnoses and all orders for this visit:  Primary hypertension  Palpitations  Elevated PSA  Pure hypercholesterolemia  Chronic fatigue  Chronic nonintractable headache, unspecified headache type  Hyperlipidemia, unspecified hyperlipidemia type  Colon cancer screening  -     Cologuard® colon cancer screening; Future  Statin intolerance

## 2025-06-03 DIAGNOSIS — I10 PRIMARY HYPERTENSION: ICD-10-CM

## 2025-06-03 RX ORDER — METOPROLOL SUCCINATE 25 MG/1
25 TABLET, EXTENDED RELEASE ORAL DAILY
Qty: 90 TABLET | Refills: 1 | Status: SHIPPED | OUTPATIENT
Start: 2025-06-03 | End: 2025-11-30

## 2025-06-11 ENCOUNTER — OFFICE VISIT (OUTPATIENT)
Dept: PRIMARY CARE | Facility: CLINIC | Age: 60
End: 2025-06-11
Payer: COMMERCIAL

## 2025-06-11 VITALS
OXYGEN SATURATION: 98 % | HEART RATE: 52 BPM | DIASTOLIC BLOOD PRESSURE: 88 MMHG | WEIGHT: 158.4 LBS | HEIGHT: 69 IN | BODY MASS INDEX: 23.46 KG/M2 | SYSTOLIC BLOOD PRESSURE: 132 MMHG

## 2025-06-11 DIAGNOSIS — E78.5 HYPERLIPIDEMIA, UNSPECIFIED HYPERLIPIDEMIA TYPE: ICD-10-CM

## 2025-06-11 DIAGNOSIS — G89.29 CHRONIC NONINTRACTABLE HEADACHE, UNSPECIFIED HEADACHE TYPE: ICD-10-CM

## 2025-06-11 DIAGNOSIS — Z12.11 COLON CANCER SCREENING: ICD-10-CM

## 2025-06-11 DIAGNOSIS — R53.82 CHRONIC FATIGUE: ICD-10-CM

## 2025-06-11 DIAGNOSIS — R00.2 PALPITATIONS: ICD-10-CM

## 2025-06-11 DIAGNOSIS — I10 PRIMARY HYPERTENSION: Primary | ICD-10-CM

## 2025-06-11 DIAGNOSIS — R97.20 ELEVATED PSA: ICD-10-CM

## 2025-06-11 DIAGNOSIS — R51.9 CHRONIC NONINTRACTABLE HEADACHE, UNSPECIFIED HEADACHE TYPE: ICD-10-CM

## 2025-06-11 DIAGNOSIS — Z78.9 STATIN INTOLERANCE: ICD-10-CM

## 2025-06-11 DIAGNOSIS — E78.00 PURE HYPERCHOLESTEROLEMIA: ICD-10-CM

## 2025-06-11 PROCEDURE — 3079F DIAST BP 80-89 MM HG: CPT | Performed by: PHYSICIAN ASSISTANT

## 2025-06-11 PROCEDURE — 99213 OFFICE O/P EST LOW 20 MIN: CPT | Performed by: PHYSICIAN ASSISTANT

## 2025-06-11 PROCEDURE — 3075F SYST BP GE 130 - 139MM HG: CPT | Performed by: PHYSICIAN ASSISTANT

## 2025-06-11 PROCEDURE — 3008F BODY MASS INDEX DOCD: CPT | Performed by: PHYSICIAN ASSISTANT

## 2025-06-11 PROCEDURE — 1036F TOBACCO NON-USER: CPT | Performed by: PHYSICIAN ASSISTANT

## 2025-06-11 ASSESSMENT — PATIENT HEALTH QUESTIONNAIRE - PHQ9
2. FEELING DOWN, DEPRESSED OR HOPELESS: NOT AT ALL
1. LITTLE INTEREST OR PLEASURE IN DOING THINGS: NOT AT ALL
SUM OF ALL RESPONSES TO PHQ9 QUESTIONS 1 AND 2: 0

## 2025-06-11 ASSESSMENT — ENCOUNTER SYMPTOMS
LOSS OF SENSATION IN FEET: 0
OCCASIONAL FEELINGS OF UNSTEADINESS: 0
DEPRESSION: 0

## 2025-06-11 ASSESSMENT — PAIN SCALES - GENERAL: PAINLEVEL_OUTOF10: 0-NO PAIN

## 2025-06-23 LAB — NONINV COLON CA DNA+OCC BLD SCRN STL QL: NEGATIVE

## 2025-08-20 LAB — PSA SERPL-MCNC: 2.21 NG/ML

## 2025-08-25 ENCOUNTER — PATIENT MESSAGE (OUTPATIENT)
Dept: UROLOGY | Facility: HOSPITAL | Age: 60
End: 2025-08-25
Payer: COMMERCIAL

## 2025-08-27 ENCOUNTER — APPOINTMENT (OUTPATIENT)
Dept: UROLOGY | Facility: HOSPITAL | Age: 60
End: 2025-08-27
Payer: COMMERCIAL

## 2025-08-27 DIAGNOSIS — R97.20 PSA ELEVATION: Primary | ICD-10-CM

## 2025-08-27 DIAGNOSIS — R97.20 ELEVATED PSA: ICD-10-CM

## 2025-08-27 PROCEDURE — 99214 OFFICE O/P EST MOD 30 MIN: CPT | Performed by: STUDENT IN AN ORGANIZED HEALTH CARE EDUCATION/TRAINING PROGRAM

## 2025-10-14 ENCOUNTER — APPOINTMENT (OUTPATIENT)
Dept: DERMATOLOGY | Facility: CLINIC | Age: 60
End: 2025-10-14
Payer: COMMERCIAL

## (undated) DEVICE — DRESSING, GAUZE, 16 PLY, 4 X 4 IN, STERILE

## (undated) DEVICE — COVER, BACK TABLE, 65 X 90, HVY REINFORCED

## (undated) DEVICE — MARKER, SKIN, DUAL TIP INK W/9 LABEL AND REMOVABLE TIME OUT SLEEVE

## (undated) DEVICE — URONAV PROBE HOLDER

## (undated) DEVICE — PROBE COVER, ULTRASOUND 8818

## (undated) DEVICE — GLOVE, PROTEXIS PI CLASSIC, SZ-7.5, PF, LF

## (undated) DEVICE — TOWEL, SURGICAL, NEURO, O/R, 16 X 26, BLUE, STERILE

## (undated) DEVICE — DRESSING, NON-ADHERENT, TELFA, OUCHLESS, 3 X 8 IN, STERILE

## (undated) DEVICE — NEEDLE GUIDE, BIOPSY, ULTRASOUND, SINGLE, UA 1322-S

## (undated) DEVICE — NEEDLE, BIOPSY, MAX CORE, 18G